# Patient Record
Sex: FEMALE | Race: BLACK OR AFRICAN AMERICAN | Employment: UNEMPLOYED | URBAN - METROPOLITAN AREA
[De-identification: names, ages, dates, MRNs, and addresses within clinical notes are randomized per-mention and may not be internally consistent; named-entity substitution may affect disease eponyms.]

---

## 2017-05-12 LAB
CHLAMYDIA, EXTERNAL: NEGATIVE
HBSAG, EXTERNAL: NEGATIVE
HIV, EXTERNAL: NON REACTIVE
N. GONORRHEA, EXTERNAL: NEGATIVE
RUBELLA, EXTERNAL: NORMAL
TYPE, ABO & RH, EXTERNAL: NORMAL

## 2017-06-09 ENCOUNTER — ANESTHESIA EVENT (OUTPATIENT)
Dept: SURGERY | Age: 37
End: 2017-06-09
Payer: COMMERCIAL

## 2017-06-09 NOTE — H&P
EDC:2017  EGA: 14 weeks, 0 days      Vital Signs   31Years Old Female  Weight: 221 pounds  BP:       120/74    Pap/HPV/Gardasil History   History of abnormal pap: yes  Gardasil Injection History: Not Applicable    Patient's Prenatal Care with Doctor of Record Rafael Solis MD Notable For -    High risk pregnancy history of PTB/incompetent cervix  Cervical incompetence, cerclage and anatoly at 14 weeks____   lab screening  Obesity in pregnancy BMI>34.99-FS ____  Previous miscarriages  Obesity (BMI > 29.99)              Past Pregnancy History      :  7     Term Births:  0     Premature Births: 1     Living Children: 1     Para:   1     Prev : 1     Aborta:  5     Elect. Ab:  0     Spont. Ab:  5     Ectopics:  0        Allergies    This patient has no known allergies. Medications Removed from Medication List        [address] for Follow-up Visit     Estimated weeks of        gestation:  14 0/7     Weight:  221     Blood pressure: 120 / 74     Urine Protein:  N     Urine Glucose: N     Headache:  +     Nausea/vomiting: No     Edema:  0     Vaginal bleeding: no     Vaginal discharge: no     Fetal activity:  N/A     FHR:   159     Next visit:  4 wk     Preceptor:  sadie     Comment:  Cx length was 42-43 mm. Cerclage scheduled for next week. WIll also begin [place] once cerclage placed. Impression & Recommendations:    Problem # 1:  Cervical incompetence, cerclage and anatoly at 15 weeks____ (ICD-V72.31) (KUE87-Z18.32)  To OR for cerclage. I reviewed with the patient indications, alternatives, risks, and benefits of proposed procedure, the risks of which include injury to bowel, bladder, nerves, blood vessels, fetus, risk of bleeding and infection, and inherent risks of anesthesia, including death. The patient indicates understanding of these risks, and agrees to the proposed procedure. She is instructed to stay NPO after midnight the night before surgery. Orders:  Antepartum Care (CPT-10)        Medications (at conclusion of this visit)    05/12/2017 PROMETRIUM 200 MG CAPS (PROGESTERONE MICRONIZED) one tab vaginally daily  05/12/2017 PRENATAL VITAMIN TABS (PRENATAL VIT-FE FUMARATE-FA TABS)           Primary Provider:  Emmanuel Hanna MD    CC:  incompetent cervix. History of Present Illness:  Presents for cerclage, hx of incompetent cervix and preganncy loss. Successful last pregnancy with cerclage placed.           Past Medical History:     Reviewed history from 07/01/2016 and no changes required:        none    Past Surgical History:     Reviewed history from 05/12/2017 and no changes required:        C section 2015        2 D&C 2014 and 2002        cerclage    Family History Summary:      Reviewed history Last on 05/12/2017 and no changes required:06/09/2017      General Comments - FH:  Family history transferred to [address] [place] [address]     Social History:     Reviewed history and no changes required:      Previous Tobacco Use: Signed On - 05/12/2017  Smoked Tobacco Use:  Never smoker  Smokeless Tobacco Use:  Never  Passive smoke exposure:  no  Drug use:  no  HIV high-risk behavior:  no  Caffeine use:  1 drinks per day    Previous Alcohol Use: Signed On - 05/12/2017  Alcohol use:  no  Exercise:  no  Seatbelt use:  100 %  Sun Exposure:  occasionally    PAP Smear History:     Date of Last PAP Smear:  03/15/2017      Review of Systems        See HPI      Vital Signs    Blood Pressure: 120 / 74    Weight:  221 pounds      Physical Exam     General           General appearance:  no acute distress    Head           Inspection:   normal    Eyes           External:   EOM intact    ENT           Dental:   adequate dentition    Chest           Lungs:  clear to auscultation          Heart:  regular rate and rhythm    Extremeties           Extremeties:  0 edema    Neurological           Reflexes:  2+ and symmetric with no pathological reflexes    Psych           Orientation: oriented to time, place, and person          Mood:  no appearance of anxiety, depression, or agitation    Lymph           Inguinal:  no inguinal adenopathy    Skin           Inspection:  no rashes, suspicious lesions, or ulcerations    Abdomen           Abdomen:  gravid    Pelvic Exam           EGBUS:  no lesions          Vagina:  normal appearing without lesions or discharge          Uterus:  gravid          Cervix:  no lesions or discharge    Allergies    This patient has no known allergies. Medications Removed from Medication List        Impression & Recommendations:    Problem # 1:  Cervical incompetence, cerclage and anatoly at 15 weeks____ (ICD-V72.31) (AQX21-H81.32)  To OR for cerclage. I reviewed with the patient indications, alternatives, risks, and benefits of proposed procedure, the risks of which include injury to bowel, bladder, nerves, blood vessels, fetus, risk of bleeding and infection, and inherent risks of anesthesia, including death. The patient indicates understanding of these risks, and agrees to the proposed procedure. She is instructed to stay NPO after midnight the night before surgery.    Orders:  Antepartum Care (CPT-10)        Medications (at conclusion of this visit)    05/12/2017 PROMETRIUM 200 MG CAPS (PROGESTERONE MICRONIZED) one tab vaginally daily  05/12/2017 PRENATAL VITAMIN TABS (PRENATAL VIT-FE FUMARATE-FA TABS)           LABORATORY DATA   TEST DATE RESULT   Group B Strep culture 07/02/2016 *                                   (Group B Strep Culture Result Field)   Blood Type 05/12/2017 O                                             (Blood Type Result Field)   Rh 05/12/2017 Positive                                   (Rh Result Field)   Rhogam Inj Given 07/02/2016 *   Tdap Vaccine Given 07/02/2016 *   Antibody Screen 05/12/2017 Negative   Rubella  [facility] Reference Ranges On or After 3/10/14                  <0.90              Non-immune      0.90 - 0.99     Equivocal      >0.99 Immune    [facility] Reference Ranges  Before 3/10/14           <5                 Non-immune             5 - 9               Equivocal            >9                 Immune  [facility] Reference Ranges       < Or = 0.90       Negative             0.91-1.09          Equivocal            > Or = 1.10       Positive   05/12/2017     2.74     TPA (T Pallidum Antibodies) 05/12/2017 Negative   Serology (RPR) 07/02/2016 *   HBsAg 05/12/2017 Negative   HIV 05/12/2017 Non Reactive   Hemoglobin 05/12/2017 13.7   Hematocrit 05/12/2017 40.7   Platelets 46/58/1666 180 X10E3/UL   TSH 07/02/2016 *   Urine Culture 05/12/2017 Negative   GC DNA Probe 05/12/2017 Negative   Chlamydia DNA 05/12/2017 Negative   PAP 03/15/2017 NIL   Flu Vaccine Given 07/02/2016 *   HGBA1C 07/02/2016 *   HGB Electro 05/12/2017 AA   T4, Free 07/02/2016 *   BG Fasting 07/02/2016 *   GTT 1H 50G 07/02/2016 *   GTT 1H 100G 07/02/2016 *   GTT 2H 100G 07/02/2016 *   GTT 3H 100G 07/02/2016 *   Glucose Plasma 07/02/2016 *   CF Accept or Decline 07/02/2016 *   CF Screen Result 05/12/2017 Negative   Nuchal Trans 07/02/2016 *   AFP Only     Tetra     AFP Serum 07/02/2016 *   CVS 07/02/2016 *   AFP Amniotic 07/02/2016 *   Amnio Karyo 07/02/2016 *   FISH 07/02/2016 *   GC Culture 07/02/2016 *   Chlamydia Cult 07/02/2016 *   Ureaplasma     Mycoplasma     WBC 05/12/2017 5.8 X10E3/UL   RBC 05/12/2017 4.52 X10E6/UL   MCV 05/12/2017 90   MCH 05/12/2017 30.3   MCHC RBC 05/12/2017 33.7     ULTRASOUND DATA   TEST DATE RESULT   Estimated Fetal Weight                                       Weight %                                                  PETE                      BPP     Cervical Length (mm) 09/26/2016 30.000     ]      Electronically signed by Kimberly Villagomez MD on 06/09/2017 at 10:42 AM    ________________________________________________________________________    The History and Physical is reviewed today.   The patient is seen and examined and no changes are required.   Emmanuel Hanna MD  6/12/2017  7:57 AM

## 2017-06-12 ENCOUNTER — ANESTHESIA (OUTPATIENT)
Dept: SURGERY | Age: 37
End: 2017-06-12
Payer: COMMERCIAL

## 2017-06-12 ENCOUNTER — HOSPITAL ENCOUNTER (OUTPATIENT)
Age: 37
Setting detail: OUTPATIENT SURGERY
Discharge: HOME | End: 2017-06-12
Attending: OBSTETRICS & GYNECOLOGY | Admitting: OBSTETRICS & GYNECOLOGY
Payer: COMMERCIAL

## 2017-06-12 VITALS
OXYGEN SATURATION: 100 % | RESPIRATION RATE: 18 BRPM | TEMPERATURE: 98.2 F | HEIGHT: 63 IN | SYSTOLIC BLOOD PRESSURE: 129 MMHG | DIASTOLIC BLOOD PRESSURE: 67 MMHG | HEART RATE: 85 BPM

## 2017-06-12 LAB
DAILY QC (YES/NO)?: YES
HGB BLD-MCNC: 13.4 G/DL (ref 11.5–16)

## 2017-06-12 PROCEDURE — 77030014125 HC TY EPDRL BBMI -B: Performed by: ANESTHESIOLOGY

## 2017-06-12 PROCEDURE — 76030000000 HC AMB SURG OR TIME 0.5 TO 1: Performed by: OBSTETRICS & GYNECOLOGY

## 2017-06-12 PROCEDURE — 74011000250 HC RX REV CODE- 250

## 2017-06-12 PROCEDURE — 76210000040 HC AMBSU PH I REC FIRST 0.5 HR: Performed by: OBSTETRICS & GYNECOLOGY

## 2017-06-12 PROCEDURE — 85018 HEMOGLOBIN: CPT | Performed by: OBSTETRICS & GYNECOLOGY

## 2017-06-12 PROCEDURE — 77030020255 HC SOL INJ LR 1000ML BG: Performed by: OBSTETRICS & GYNECOLOGY

## 2017-06-12 PROCEDURE — 77030018846 HC SOL IRR STRL H20 ICUM -A: Performed by: OBSTETRICS & GYNECOLOGY

## 2017-06-12 PROCEDURE — 76210000056 HC AMBSU PH II REC 1.5 TO 2 HR: Performed by: OBSTETRICS & GYNECOLOGY

## 2017-06-12 PROCEDURE — 74011250636 HC RX REV CODE- 250/636: Performed by: ANESTHESIOLOGY

## 2017-06-12 PROCEDURE — 74011250636 HC RX REV CODE- 250/636

## 2017-06-12 PROCEDURE — 77030021352 HC CBL LD SYS DISP COVD -B: Performed by: OBSTETRICS & GYNECOLOGY

## 2017-06-12 PROCEDURE — 77030005537 HC CATH URETH BARD -A: Performed by: OBSTETRICS & GYNECOLOGY

## 2017-06-12 PROCEDURE — 76060000061 HC AMB SURG ANES 0.5 TO 1 HR: Performed by: OBSTETRICS & GYNECOLOGY

## 2017-06-12 PROCEDURE — 77030002986 HC SUT PROL J&J -A: Performed by: OBSTETRICS & GYNECOLOGY

## 2017-06-12 PROCEDURE — 77030002937 HC SUT MERS J&J -B: Performed by: OBSTETRICS & GYNECOLOGY

## 2017-06-12 RX ORDER — SODIUM CHLORIDE, SODIUM LACTATE, POTASSIUM CHLORIDE, CALCIUM CHLORIDE 600; 310; 30; 20 MG/100ML; MG/100ML; MG/100ML; MG/100ML
25 INJECTION, SOLUTION INTRAVENOUS CONTINUOUS
Status: DISCONTINUED | OUTPATIENT
Start: 2017-06-12 | End: 2017-06-12 | Stop reason: HOSPADM

## 2017-06-12 RX ORDER — ACETAMINOPHEN AND CODEINE PHOSPHATE 300; 30 MG/1; MG/1
2 TABLET ORAL
Qty: 20 TAB | Refills: 0 | Status: ON HOLD | OUTPATIENT
Start: 2017-06-12 | End: 2017-12-12

## 2017-06-12 RX ORDER — OXYCODONE AND ACETAMINOPHEN 5; 325 MG/1; MG/1
1 TABLET ORAL ONCE
Status: DISCONTINUED | OUTPATIENT
Start: 2017-06-12 | End: 2017-06-12 | Stop reason: HOSPADM

## 2017-06-12 RX ORDER — SODIUM CHLORIDE 0.9 % (FLUSH) 0.9 %
5-10 SYRINGE (ML) INJECTION AS NEEDED
Status: DISCONTINUED | OUTPATIENT
Start: 2017-06-12 | End: 2017-06-12 | Stop reason: HOSPADM

## 2017-06-12 RX ORDER — ONDANSETRON 2 MG/ML
4 INJECTION INTRAMUSCULAR; INTRAVENOUS AS NEEDED
Status: DISCONTINUED | OUTPATIENT
Start: 2017-06-12 | End: 2017-06-12 | Stop reason: HOSPADM

## 2017-06-12 RX ORDER — LIDOCAINE HYDROCHLORIDE 10 MG/ML
0.1 INJECTION, SOLUTION EPIDURAL; INFILTRATION; INTRACAUDAL; PERINEURAL AS NEEDED
Status: DISCONTINUED | OUTPATIENT
Start: 2017-06-12 | End: 2017-06-12 | Stop reason: HOSPADM

## 2017-06-12 RX ORDER — DIPHENHYDRAMINE HYDROCHLORIDE 50 MG/ML
12.5 INJECTION, SOLUTION INTRAMUSCULAR; INTRAVENOUS AS NEEDED
Status: DISCONTINUED | OUTPATIENT
Start: 2017-06-12 | End: 2017-06-12 | Stop reason: HOSPADM

## 2017-06-12 RX ORDER — PHENYLEPHRINE HCL IN 0.9% NACL 0.4MG/10ML
SYRINGE (ML) INTRAVENOUS AS NEEDED
Status: DISCONTINUED | OUTPATIENT
Start: 2017-06-12 | End: 2017-06-12 | Stop reason: HOSPADM

## 2017-06-12 RX ORDER — MORPHINE SULFATE 10 MG/ML
2 INJECTION, SOLUTION INTRAMUSCULAR; INTRAVENOUS
Status: DISCONTINUED | OUTPATIENT
Start: 2017-06-12 | End: 2017-06-12 | Stop reason: HOSPADM

## 2017-06-12 RX ORDER — CHLOROPROCAINE HYDROCHLORIDE 30 MG/ML
INJECTION, SOLUTION EPIDURAL; INFILTRATION; INTRACAUDAL; PERINEURAL
Status: DISCONTINUED
Start: 2017-06-12 | End: 2017-06-12 | Stop reason: HOSPADM

## 2017-06-12 RX ORDER — FENTANYL CITRATE 50 UG/ML
25 INJECTION, SOLUTION INTRAMUSCULAR; INTRAVENOUS
Status: DISCONTINUED | OUTPATIENT
Start: 2017-06-12 | End: 2017-06-12 | Stop reason: HOSPADM

## 2017-06-12 RX ORDER — SODIUM CHLORIDE, SODIUM LACTATE, POTASSIUM CHLORIDE, CALCIUM CHLORIDE 600; 310; 30; 20 MG/100ML; MG/100ML; MG/100ML; MG/100ML
INJECTION, SOLUTION INTRAVENOUS
Status: DISCONTINUED | OUTPATIENT
Start: 2017-06-12 | End: 2017-06-12 | Stop reason: HOSPADM

## 2017-06-12 RX ORDER — FENTANYL CITRATE 50 UG/ML
INJECTION, SOLUTION INTRAMUSCULAR; INTRAVENOUS AS NEEDED
Status: DISCONTINUED | OUTPATIENT
Start: 2017-06-12 | End: 2017-06-12 | Stop reason: HOSPADM

## 2017-06-12 RX ORDER — HYDROMORPHONE HYDROCHLORIDE 1 MG/ML
.2-.5 INJECTION, SOLUTION INTRAMUSCULAR; INTRAVENOUS; SUBCUTANEOUS ONCE
Status: DISCONTINUED | OUTPATIENT
Start: 2017-06-12 | End: 2017-06-12 | Stop reason: HOSPADM

## 2017-06-12 RX ORDER — SODIUM CHLORIDE 0.9 % (FLUSH) 0.9 %
5-10 SYRINGE (ML) INJECTION EVERY 8 HOURS
Status: DISCONTINUED | OUTPATIENT
Start: 2017-06-12 | End: 2017-06-12 | Stop reason: HOSPADM

## 2017-06-12 RX ORDER — ONDANSETRON 2 MG/ML
INJECTION INTRAMUSCULAR; INTRAVENOUS AS NEEDED
Status: DISCONTINUED | OUTPATIENT
Start: 2017-06-12 | End: 2017-06-12 | Stop reason: HOSPADM

## 2017-06-12 RX ADMIN — SODIUM CHLORIDE, SODIUM LACTATE, POTASSIUM CHLORIDE, CALCIUM CHLORIDE: 600; 310; 30; 20 INJECTION, SOLUTION INTRAVENOUS at 09:02

## 2017-06-12 RX ADMIN — FENTANYL CITRATE 50 MCG: 50 INJECTION, SOLUTION INTRAMUSCULAR; INTRAVENOUS at 08:28

## 2017-06-12 RX ADMIN — SODIUM CHLORIDE, SODIUM LACTATE, POTASSIUM CHLORIDE, AND CALCIUM CHLORIDE 500 ML: 600; 310; 30; 20 INJECTION, SOLUTION INTRAVENOUS at 08:10

## 2017-06-12 RX ADMIN — Medication 40 MCG: at 08:44

## 2017-06-12 RX ADMIN — FENTANYL CITRATE 50 MCG: 50 INJECTION, SOLUTION INTRAMUSCULAR; INTRAVENOUS at 08:19

## 2017-06-12 RX ADMIN — ONDANSETRON 4 MG: 2 INJECTION INTRAMUSCULAR; INTRAVENOUS at 08:47

## 2017-06-12 RX ADMIN — SODIUM CHLORIDE, SODIUM LACTATE, POTASSIUM CHLORIDE, CALCIUM CHLORIDE: 600; 310; 30; 20 INJECTION, SOLUTION INTRAVENOUS at 08:05

## 2017-06-12 NOTE — ANESTHESIA POSTPROCEDURE EVALUATION
Post-Anesthesia Evaluation and Assessment    Patient: Niurka Gonsalez MRN: [id number]  SSN: [id number]    YOB: 1980  Age: 31 y.o. Sex: female       Cardiovascular Function/Vital Signs  Visit Vitals    /67    Pulse 85    Temp 36.8 °C (98.2 °F)    Resp 18    Ht 5' 3\" (1.6 m)    SpO2 100%       Patient is status post spinal, MAC anesthesia for Procedure(s):  CERVICAL CERCLAGE  (CHOICE). Nausea/Vomiting: None    Postoperative hydration reviewed and adequate. Pain:  Pain Scale 1: Numeric (0 - 10) (06/12/17 1100)  Pain Intensity 1: 0 (06/12/17 1100)   Managed    Neurological Status:   Neuro (WDL): Within Defined Limits (06/12/17 1100)  Neuro  LUE Motor Response: Spontaneous  (06/12/17 1030)  LLE Motor Response: Spontaneous  (06/12/17 1030)  RUE Motor Response: Spontaneous  (06/12/17 1030)  RLE Motor Response: Spontaneous  (06/12/17 1030)   At baseline    Mental Status and Level of Consciousness: Arousable    Pulmonary Status:   O2 Device: Room air (06/12/17 0912)   Adequate oxygenation and airway patent    Complications related to anesthesia: None    Post-anesthesia assessment completed.  No concerns    Signed By: Randal Cruz MD     June 12, 2017

## 2017-06-12 NOTE — ANESTHESIA PROCEDURE NOTES
Spinal Block    Start time: 6/12/2017 8:23 AM  End time: 6/12/2017 8:37 AM  Performed by: Reynaldo Guerra  Authorized by: Reynaldo Guerra     Pre-procedure: Indications: at surgeon's request and primary anesthetic  Preanesthetic Checklist: patient identified, risks and benefits discussed, anesthesia consent, site marked, patient being monitored and timeout performed    Timeout Time: 08:25          Spinal Block:   Patient Position:  Seated  Prep Region:  Lumbar  Prep: Betadine and patient draped      Location:  L3-4  Technique:  Single shot  Anesthesia block local: 3% Chloroprocaine used for spinal block.   2% lidocaine skin wheal.  Local Dose (mL):  1.5    Needle:   Needle Type:  Pencan  Needle Gauge:  25 G  Attempts:  2      Events: CSF confirmed, no blood with aspiration and no paresthesia        Assessment:  Insertion:  Uncomplicated  Patient tolerance:  Patient tolerated the procedure well with no immediate complications

## 2017-06-12 NOTE — OP NOTES
OB/GYN BRIEF OPERATIVE NOTE  Collin Sena  Date of Surgery: 6/12/2017  Preoperative Diagnosis: CERVICAL INCOMPETENCE, PREGNANT  Postoperative Diagnosis: CERVICAL INCOMPETENCE, PREGNANT    Procedure: Cervical Cerclage    Surgeon: Myah Tinajero MD  Assistant(s): Hospital Assistant  Anesthesia: Other   Estimated Blood Loss:  minimal  Specimens: * No specimens in log *   Findings: normal appearing cervix. Complications: None  Drains: None    Procedure Detail:      After proper patient identification and consent, the patient was taken to the operating room, where spinal anesthesia was administered and found to be adequate. Red rubber catheter is used to drain the bladder. The patient was prepped and draped in the normal sterile fashion. A sidearm speculum is inserted into the vagina and the cervix identified. A suture of #1 prolene is placed at 6:00 on the cervix 1 cm proximal to the tip and brought out at 3:00. It is replaced at 3:00 and brought out at 12:00, replaced at 12:00 and brought out at 9:00, replaced at 9:00 and brought out again at 6:00 and securely tied. A knot for future identification is placed 1 cm from the original knot and the suture is then held for cervical traction. Another #1 prolene is placed at 6:00 about 2 cm proximal to the first stitch and brought through and around the cervix and fastened identically. The cervix is hemostatic at the end of the case and tightly closed on exam.  The speculum is removed and the vagina swiped clear of blood and debris. Instrument, lap and needle counts are correct x 2. The pt is taken to PACU in stable condition.         Myah Tinajero MD

## 2017-06-12 NOTE — PERIOP NOTES
PACU~  0910-Pt received to PACU, drowsy, denies complaints. VSS. Had spinal, unable to move legs. NSR with occ PVC's, same as preop & intraop. SCD's connected. 0915-Dr Sargent at bedside. FHT's auscultated at 155 bpm. Dermatome levels assessed, T10 on right  0930-Dermatomes at T11. HOB up, pt drinking juice and eating crackers. Denies any complaints. 0945- here. Discharge instructions reviewed. Questions answered. He has small child with him so he will return to waiting room. 1015-Pt resting with eyes closed. VSS. 1040-Pt able to bend legs and lift buttocks off of stretcher slightly. No vaginal bleeding  1052-Pt awake, denies complaints. HOB up, pt drinking soda and eating peanut butter crackers. 1105-Pt states she is feeling the urge to void. She is moving/bending her legs and able to pick buttocks off of bed. Assisted pt with dressing. No vaginal bleeding. 1110-Pt able to stand and bear full weight. Pt ambulated to bathroom with 2 nurse assist. Gait steady. Pt voided.  called for ride. 1115- PIV removed. 1120-Pt discharged home in stable condition via w/c to car in stable condition. Pt nor  have any further questions or concerns regarding d/c instructions.

## 2017-06-12 NOTE — DISCHARGE INSTRUCTIONS
After Care Instructions For Cervical Conization    1. You may resume your usual diet once the nausea resolves. Initially, you may try sips of warm fluids and a bland diet. 2. Avoid heavy lifting and straining. Gradually increase your activity. First, try walking and doing light activity around the house. Resume your normal habits if no significant discomfort or bleeding develops. Most women can return to work within one to four days after this procedure. You may take showers. Avoid using a tub bath, swimming pool or hot tub for 2 weeks    It takes the cervix 5-6 weeks to heal.  During this time, there will be some minimal vaginal bleeding or yellow vaginal discharge. This may increase with activity. 4. Do not put anything in your vagina as this may cause excessive bleeding or infection. Do not douche, use tampons or have intercourse for at least 2 weeks. 5. It is normal to feel some mild crampy pain in your lower abdomen. This discomfort should be relieved with Tylenol 3 which is prescribed    6. Contact the office if you have excessive bleeding (saturating a pad an hour for two hours or passing large clots), chills, or a temperature greater than 100.4.     7. You should be seen in the office following the procedure. Your physician should have given you specific information regarding the appropriate time for this appointment. Please contact the office for an appointment if this has not already been arranged. Our office phone number is [phone]. If appropriate, the microscopic results from your procedure will be discussed at this follow-up visit. Frequently the results have returned prior to this visit and if this is the case, you will be contacted by phone.          DO NOT TAKE TYLENOL/ACETAMINOPHEN WITH Tylenol with Codeine    TAKE NARCOTIC PAIN MEDICATIONS WITH FOOD   Narcotics tend to be constipating, we suggest taking a stool softener such as Colace or Miralax (follow package instructions). DO NOT DRIVE WHILE TAKING NARCOTIC PAIN MEDICATIONS. DO NOT TAKE SLEEPING MEDICATIONS OR ANTIANXIETY MEDICATIONS WHILE TAKING NARCOTIC PAIN MEDICATIONS,  ESPECIALLY THE NIGHT OF ANESTHESIA. CPAP PATIENTS BE SURE TO WEAR MACHINE WHENEVER NAPPING OR SLEEPING. DISCHARGE SUMMARY from Nurse    The following personal items collected during your admission are returned to you:   Dental Appliance: Dental Appliances: None  Vision: Visual Aid: Glasses AT HOME  Hearing Aid:    Jewelry: Jewelry: None  Clothing: Clothing: With patient  Other Valuables: Other Valuables: None  Valuables sent to safe:        PATIENT INSTRUCTIONS:    After General Anesthesia or Intravenous Sedation, for 24 hours or while taking prescription Narcotics:        Someone should be with you for the next 24 hours. For your own safety, a responsible adult must drive you home. · Limit your activities  · Recommended activity: Rest today, up with assistance today. Do not climb stairs or shower unattended for the next 24 hours. · Please start with a soft bland diet and advance as tolerated (no nausea) to regular diet. · If you have a sore throat you should try the following: fluids, warm salt water gargles, or throat lozenges. If it does not improve after several days please follow up with your primary physician. · Do not drive and operate hazardous machinery  · Do not make important personal or business decisions  · Do  not drink alcoholic beverages  · If you have not urinated within 8 hours after discharge, please contact your surgeon on call.     Report the following to your surgeon:  · Excessive pain, swelling, redness or odor of or around the surgical area  · Temperature over 100.5  · Nausea and vomiting lasting longer than 4 hours or if unable to take medications  · Any signs of decreased circulation or nerve impairment to extremity: change in color, persistent  numbness, tingling, coldness or increase pain      · You will receive a Post Operative Call from one of the Recovery Room Nurses on the day after your surgery to check on you. It is very important for us to know how you are recovering after your surgery. If you have an issue or need to speak with someone, please call your surgeon, do not wait for the post operative call. · You may receive an e-mail or letter in the mail from Lucía regarding your experience with us in the Ambulatory Surgery Unit. Your feedback is valuable to us and we appreciate your participation in the survey. · If the above instructions are not adequate, please contact Jhoan Reese RN, [facility] anesthesia Nurse Manager or our Anesthesiologist, at [phone]. If you are having problems after your surgery, call the physician at his office number. · We wish you a speedy recovery ? What to do at Home:      *  Please give a list of your current medications to your Primary Care Provider. *  Please update this list whenever your medications are discontinued, doses are      changed, or new medications (including over-the-counter products) are added. *  Please carry medication information at all times in case of emergency situations. These are general instructions for a healthy lifestyle:    No smoking/ No tobacco products/ Avoid exposure to second hand smoke    Surgeon General's Warning:  Quitting smoking now greatly reduces serious risk to your health. Obesity, smoking, and sedentary lifestyle greatly increases your risk for illness    A healthy diet, regular physical exercise & weight monitoring are important for maintaining a healthy lifestyle    You may be retaining fluid if you have a history of heart failure or if you experience any of the following symptoms:  Weight gain of 3 pounds or more overnight or 5 pounds in a week, increased swelling in our hands or feet or shortness of breath while lying flat in bed.   Please call your doctor as soon as you notice any of these symptoms; do not wait until your next office visit. Recognize signs and symptoms of STROKE:    B - Balance  E - Eyes    F-  Face looks uneven    A-  Arms unable to move or move even    S-  Speech slurred or non-existent    T-  Time-call 911 as soon as signs and symptoms begin-DO NOT go       Back to bed or wait to see if you get better-TIME IS BRAIN. If you have not received your influenza and/or pneumococcal vaccine, please follow up with your primary care physician. The discharge information has been reviewed with the patient and spouse. The patient and spouse verbalized understanding.

## 2017-06-12 NOTE — PERIOP NOTES
Ender Ibrahim  1980  [id number]    Situation:  Verbal report given from: MELISSA Carroll CRNA  Procedure: Procedure(s):  CERVICAL CERCLAGE  (CHOICE)    Background:    Preoperative diagnosis: CERVICAL INCOMPITENCE, HIGH RISK PREGNANCY    Postoperative diagnosis: CERVICAL INCOMPITENCE, HIGH RISK PREGNANCY    :  Dr. Barbara Ramos    Assistant(s): Circ-1: Emma Anaya RN  Circ-Relief: Ricardo Markham RN  Scrub Tech-1: Catracho Nab  Surg Asst-1: Minnie Funez    Specimens: * No specimens in log *    Assessment:  Intra-procedure medications         Anesthesia gave intra-procedure sedation and medications, see anesthesia flow sheet     Intravenous fluids: LR@ KVO     Vital signs stable       Recommendation:    Permission to share finding with family or friend yes

## 2017-06-12 NOTE — ANESTHESIA PREPROCEDURE EVALUATION
Anesthetic History   No history of anesthetic complications            Review of Systems / Medical History  Patient summary reviewed, nursing notes reviewed and pertinent labs reviewed    Pulmonary  Within defined limits                 Neuro/Psych   Within defined limits           Cardiovascular  Within defined limits                     GI/Hepatic/Renal  Within defined limits              Endo/Other        Morbid obesity     Other Findings   Comments: IUP at 14 weeks  Cervical incompetence         Physical Exam    Airway  Mallampati: I  TM Distance: 4 - 6 cm  Neck ROM: normal range of motion   Mouth opening: Normal     Cardiovascular    Rhythm: regular  Rate: normal      Pertinent negatives: No murmur   Dental  No notable dental hx       Pulmonary  Breath sounds clear to auscultation               Abdominal  GI exam deferred       Other Findings            Anesthetic Plan    ASA: 2  Anesthesia type: spinal and MAC          Induction: Intravenous  Anesthetic plan and risks discussed with: Patient      FHTs 154

## 2017-06-12 NOTE — IP AVS SNAPSHOT
[address] [facility] 
[phone] Patient: Mildred Mendez MRN: [id number] NVX:8/59/0278 You are allergic to the following No active allergies Recent Documentation Height OB Status Smoking Status 1.6 m Pregnant Never Smoker Emergency Contacts Name Discharge Info Relation Home Work Mobile Jaden Hart DISCHARGE CAREGIVER [3] Spouse [3] [phone] About your hospitalization You were admitted on:  June 12, 2017 You last received care in the:  [facility] PACU You were discharged on:  June 12, 2017 Unit phone number:  [phone] Why you were hospitalized Your primary diagnosis was:  Not on File Providers Seen During Your Hospitalizations Provider Role Specialty Primary office phone Sarina Welsh MD Attending Provider Obstetrics & Gynecology [phone] Your Primary Care Physician (PCP) Primary Care Physician Office Phone Office Fax NONE ** None ** ** None ** Follow-up Information Follow up With Details Comments Contact Info Sarina Welsh MD Schedule an appointment as soon as possible for a visit in 1 week  [address] [address] [address] 
[phone] Current Discharge Medication List  
  
START taking these medications Dose & Instructions Dispensing Information Comments Morning Noon Evening Bedtime  
 acetaminophen-codeine 300-30 mg per tablet Commonly known as:  TYLENOL-CODEINE #3 Your last dose was: Your next dose is:    
   
   
 Dose:  2 Tab Take 2 Tabs by mouth every four (4) hours as needed for Pain. Max Daily Amount: 12 Tabs. Quantity:  20 Tab Refills:  0 CONTINUE these medications which have NOT CHANGED Dose & Instructions Dispensing Information Comments Morning Noon Evening Bedtime PRENATAL #[id number] ACID PO Your last dose was: Your next dose is:    
   
   
 Dose:  1 Tab Take 1 Tab by mouth daily. Refills:  0  
     
   
   
   
  
 progesterone 200 mg capsule Commonly known as:  PROMETRIUM Your last dose was: Your next dose is:    
   
   
 Dose:  200 mg Insert 200 mg into vagina daily. Refills:  0 Where to Get Your Medications Information on where to get these meds will be given to you by the nurse or doctor. ! Ask your nurse or doctor about these medications  
  acetaminophen-codeine 300-30 mg per tablet Discharge Instructions After Care Instructions For Cervical Conization 1. You may resume your usual diet once the nausea resolves. Initially, you may try sips of warm fluids and a bland diet. 2. Avoid heavy lifting and straining. Gradually increase your activity. First, try walking and doing light activity around the house. Resume your normal habits if no significant discomfort or bleeding develops. Most women can return to work within one to four days after this procedure. You may take showers. Avoid using a tub bath, swimming pool or hot tub for 2 weeks It takes the cervix 5-6 weeks to heal.  During this time, there will be some minimal vaginal bleeding or yellow vaginal discharge. This may increase with activity. 4. Do not put anything in your vagina as this may cause excessive bleeding or infection. Do not douche, use tampons or have intercourse for at least 2 weeks. 5. It is normal to feel some mild crampy pain in your lower abdomen. This discomfort should be relieved with Tylenol 3 which is prescribed 6. Contact the office if you have excessive bleeding (saturating a pad an hour for two hours or passing large clots), chills, or a temperature greater than 100.4. 7. You should be seen in the office following the procedure. Your physician should have given you specific information regarding the appropriate time for this appointment. Please contact the office for an appointment if this has not already been arranged. Our office phone number is [phone]. If appropriate, the microscopic results from your procedure will be discussed at this follow-up visit. Frequently the results have returned prior to this visit and if this is the case, you will be contacted by phone. DO NOT TAKE TYLENOL/ACETAMINOPHEN WITH Tylenol with Codeine TAKE NARCOTIC PAIN MEDICATIONS WITH FOOD Narcotics tend to be constipating, we suggest taking a stool softener such as Colace or Miralax (follow package instructions). DO NOT DRIVE WHILE TAKING NARCOTIC PAIN MEDICATIONS. DO NOT TAKE SLEEPING MEDICATIONS OR ANTIANXIETY MEDICATIONS WHILE TAKING NARCOTIC PAIN MEDICATIONS,  ESPECIALLY THE NIGHT OF ANESTHESIA. CPAP PATIENTS BE SURE TO WEAR MACHINE WHENEVER NAPPING OR SLEEPING. DISCHARGE SUMMARY from Nurse The following personal items collected during your admission are returned to you:  
Dental Appliance: Dental Appliances: None Vision: Visual Aid: Glasses AT HOME Hearing Aid:   
Jewelry: Jewelry: None Clothing: Clothing: With patient Other Valuables: Other Valuables: None Valuables sent to safe:   
 
 
PATIENT INSTRUCTIONS: 
 
 
B - Balance E - Eyes F-  Face looks uneven A-  Arms unable to move or move even S-  Speech slurred or non-existent T-  Time-call 911 as soon as signs and symptoms begin-DO NOT go Back to bed or wait to see if you get better-TIME IS BRAIN. If you have not received your influenza and/or pneumococcal vaccine, please follow up with your primary care physician. The discharge information has been reviewed with the patient and spouse. The patient and spouse verbalized understanding. Discharge Instructions Attachments/References MEFS - ACETAMINOPHEN/CODEINE (TYLENOL WITH CODEINE NO. 3, TYLENOL W/CODEINE #4, TYLENOL WITH CODEINE NO. 4, TYLENOL W/CODEINE #3) - (BY MOUTH) ([facility]. .. Discharge Orders None Introducing [facility] SERVICES! Koki Naid[employer] introduces [employer] patient portal. Now you can access parts of your medical record, email your doctor's office, and request medication refills online. 1. In your internet browser, go to [email or web address] 2. Click on the First Time User? Click Here link in the Sign In box. You will see the New Member Sign Up page. 3. Enter your [employer] Access Code exactly as it appears below. You will not need to use this code after youve completed the sign-up process. If you do not sign up before the expiration date, you must request a new code. · Neo[employer] Access Code: [email or web address] Expires: 9/6/2017  6:41 AM 
 
4. Enter the last four digits of your Social Security Number (xxxx) and Date of Birth (mm/dd/yyyy) as indicated and click Submit. You will be taken to the next sign-up page. 5. Create a NeoStem ID. This will be your NeoSt[employer] login ID and cannot be changed, so think of one that is secure and easy to remember. 6. Create a NeoStem password. You can change your password at any time. 7. Enter your Password Reset Question and Answer. This can be used at a later time if you forget your password. 8. Enter your e-mail address. You will receive e-mail notification when new information is available in [address] 9. Click Sign Up. You can now view and download portions of your medical record. 10. Click the Download Summary menu link to download a portable copy of your medical information. If you have questions, please visit the Frequently Asked Questions section of the [employer] website. Remember, Neo[employer] is NOT to be used for urgent needs. For medical emergencies, dial 911. Now available from your iPhone and Android! General Information Please provide this summary of care documentation to your next provider. Patient Signature:  ____________________________________________________________ Date:  ____________________________________________________________  
  
Juana Schwabd Provider Signature:  ____________________________________________________________ Date:  ____________________________________________________________ More Information Acetaminophen/Codeine (Tylenol with Codeine No. 3, Tylenol w/Codeine #4, Tylenol With Codeine No. 4, Tylenol w/Codeine #3) - (By mouth) Why this medicine is used:  
Treats mild to moderately severe pain. This medicine contains a narcotic pain reliever. Contact a nurse or doctor right away if you have: 
· Extreme weakness, shallow breathing, slow heartbeat · Extreme drowsiness or confusion · Sweating or cold, clammy skin · Dark urine or pale stools, nausea, vomiting, loss of appetite, stomach pain · Yellow skin or eyes Common side effects: 
· Constipation · Nausea, vomiting · Tiredness © 2017 [facility] Information is for End User's use only and may not be sold, redistributed or otherwise used for commercial purposes.

## 2017-09-19 LAB
ANTIBODY SCREEN, EXTERNAL: NEGATIVE
T. PALLIDUM, EXTERNAL: NEGATIVE

## 2017-11-02 LAB — GRBS, EXTERNAL: POSITIVE

## 2017-12-12 ENCOUNTER — ANESTHESIA (OUTPATIENT)
Dept: LABOR AND DELIVERY | Age: 37
DRG: 540 | End: 2017-12-12
Payer: MEDICAID

## 2017-12-12 ENCOUNTER — HOSPITAL ENCOUNTER (INPATIENT)
Age: 37
LOS: 4 days | Discharge: HOME | DRG: 540 | End: 2017-12-16
Attending: OBSTETRICS & GYNECOLOGY | Admitting: OBSTETRICS & GYNECOLOGY
Payer: MEDICAID

## 2017-12-12 ENCOUNTER — ANESTHESIA EVENT (OUTPATIENT)
Dept: LABOR AND DELIVERY | Age: 37
DRG: 540 | End: 2017-12-12
Payer: MEDICAID

## 2017-12-12 PROBLEM — D69.6 THROMBOCYTOPENIA AFFECTING PREGNANCY (HCC): Status: ACTIVE | Noted: 2017-12-12

## 2017-12-12 PROBLEM — O99.119 THROMBOCYTOPENIA AFFECTING PREGNANCY (HCC): Status: ACTIVE | Noted: 2017-12-12

## 2017-12-12 PROBLEM — Z34.90 PREGNANT: Status: ACTIVE | Noted: 2017-12-12

## 2017-12-12 PROBLEM — O41.00X0 OLIGOHYDRAMNIOS: Status: ACTIVE | Noted: 2017-12-12

## 2017-12-12 PROBLEM — O13.9 GESTATIONAL HYPERTENSION: Status: ACTIVE | Noted: 2017-12-12

## 2017-12-12 LAB
ABO + RH BLD: NORMAL
ALBUMIN SERPL-MCNC: 2.7 G/DL (ref 3.5–5)
ALBUMIN/GLOB SERPL: 0.8 {RATIO} (ref 1.1–2.2)
ALP SERPL-CCNC: 133 U/L (ref 45–117)
ALT SERPL-CCNC: 15 U/L (ref 12–78)
ANION GAP SERPL CALC-SCNC: 10 MMOL/L (ref 5–15)
AST SERPL-CCNC: 15 U/L (ref 15–37)
BILIRUB SERPL-MCNC: 0.7 MG/DL (ref 0.2–1)
BLOOD GROUP ANTIBODIES SERPL: NORMAL
BUN SERPL-MCNC: 6 MG/DL (ref 6–20)
BUN/CREAT SERPL: 14 (ref 12–20)
CALCIUM SERPL-MCNC: 8.4 MG/DL (ref 8.5–10.1)
CHLORIDE SERPL-SCNC: 108 MMOL/L (ref 97–108)
CO2 SERPL-SCNC: 22 MMOL/L (ref 21–32)
CREAT SERPL-MCNC: 0.42 MG/DL (ref 0.55–1.02)
CREAT UR-MCNC: 25.8 MG/DL
ERYTHROCYTE [DISTWIDTH] IN BLOOD BY AUTOMATED COUNT: 13.9 % (ref 11.5–14.5)
GLOBULIN SER CALC-MCNC: 3.6 G/DL (ref 2–4)
GLUCOSE SERPL-MCNC: 74 MG/DL (ref 65–100)
HCT VFR BLD AUTO: 35.2 % (ref 35–47)
HGB BLD-MCNC: 12.5 G/DL (ref 11.5–16)
LDH SERPL L TO P-CCNC: 135 U/L (ref 81–246)
MCH RBC QN AUTO: 32 PG (ref 26–34)
MCHC RBC AUTO-ENTMCNC: 35.5 G/DL (ref 30–36.5)
MCV RBC AUTO: 90 FL (ref 80–99)
PLATELET # BLD AUTO: 118 K/UL (ref 150–400)
POTASSIUM SERPL-SCNC: 3.2 MMOL/L (ref 3.5–5.1)
PROT SERPL-MCNC: 6.3 G/DL (ref 6.4–8.2)
PROT UR-MCNC: 6 MG/DL (ref 0–11.9)
PROT/CREAT UR-RTO: 0.2
RBC # BLD AUTO: 3.91 M/UL (ref 3.8–5.2)
SODIUM SERPL-SCNC: 140 MMOL/L (ref 136–145)
SPECIMEN EXP DATE BLD: NORMAL
URATE SERPL-MCNC: 4.3 MG/DL (ref 2.6–6)
WBC # BLD AUTO: 7.1 K/UL (ref 3.6–11)

## 2017-12-12 PROCEDURE — 77030008459 HC STPLR SKN COOP -B

## 2017-12-12 PROCEDURE — 77030002974 HC SUT PLN J&J -A

## 2017-12-12 PROCEDURE — 77030018809 HC RETRCTR ALXSO DISP AMR -B

## 2017-12-12 PROCEDURE — 86900 BLOOD TYPING SEROLOGIC ABO: CPT | Performed by: OBSTETRICS & GYNECOLOGY

## 2017-12-12 PROCEDURE — 59025 FETAL NON-STRESS TEST: CPT

## 2017-12-12 PROCEDURE — 77030018836 HC SOL IRR NACL ICUM -A

## 2017-12-12 PROCEDURE — 77010026065 HC OXYGEN MINIMUM MEDICAL AIR

## 2017-12-12 PROCEDURE — 76010000392 HC C SECN EA ADDL 0.5 HR: Performed by: OBSTETRICS & GYNECOLOGY

## 2017-12-12 PROCEDURE — 99285 EMERGENCY DEPT VISIT HI MDM: CPT

## 2017-12-12 PROCEDURE — 84550 ASSAY OF BLOOD/URIC ACID: CPT | Performed by: OBSTETRICS & GYNECOLOGY

## 2017-12-12 PROCEDURE — 74011250636 HC RX REV CODE- 250/636: Performed by: OBSTETRICS & GYNECOLOGY

## 2017-12-12 PROCEDURE — 74011250636 HC RX REV CODE- 250/636

## 2017-12-12 PROCEDURE — 76815 OB US LIMITED FETUS(S): CPT

## 2017-12-12 PROCEDURE — 84156 ASSAY OF PROTEIN URINE: CPT | Performed by: OBSTETRICS & GYNECOLOGY

## 2017-12-12 PROCEDURE — 76060000078 HC EPIDURAL ANESTHESIA: Performed by: OBSTETRICS & GYNECOLOGY

## 2017-12-12 PROCEDURE — 74011250636 HC RX REV CODE- 250/636: Performed by: ANESTHESIOLOGY

## 2017-12-12 PROCEDURE — 36415 COLL VENOUS BLD VENIPUNCTURE: CPT | Performed by: OBSTETRICS & GYNECOLOGY

## 2017-12-12 PROCEDURE — 10907ZC DRAINAGE OF AMNIOTIC FLUID, THERAPEUTIC FROM PRODUCTS OF CONCEPTION, VIA NATURAL OR ARTIFICIAL OPENING: ICD-10-PCS | Performed by: OBSTETRICS & GYNECOLOGY

## 2017-12-12 PROCEDURE — 77030031139 HC SUT VCRL2 J&J -A

## 2017-12-12 PROCEDURE — 77030035236 HC SUT PDS STRATFX BARB J&J -B

## 2017-12-12 PROCEDURE — 77030011640 HC PAD GRND REM COVD -A

## 2017-12-12 PROCEDURE — 83615 LACTATE (LD) (LDH) ENZYME: CPT | Performed by: OBSTETRICS & GYNECOLOGY

## 2017-12-12 PROCEDURE — 75410000002 HC LABOR FEE PER 1 HR

## 2017-12-12 PROCEDURE — 75410000003 HC RECOV DEL/VAG/CSECN EA 0.5 HR

## 2017-12-12 PROCEDURE — 76010000391 HC C SECN FIRST 1 HR: Performed by: OBSTETRICS & GYNECOLOGY

## 2017-12-12 PROCEDURE — 85027 COMPLETE CBC AUTOMATED: CPT | Performed by: OBSTETRICS & GYNECOLOGY

## 2017-12-12 PROCEDURE — 74011000250 HC RX REV CODE- 250

## 2017-12-12 PROCEDURE — 77030032490 HC SLV COMPR SCD KNE COVD -B

## 2017-12-12 PROCEDURE — 80053 COMPREHEN METABOLIC PANEL: CPT | Performed by: OBSTETRICS & GYNECOLOGY

## 2017-12-12 PROCEDURE — 65410000002 HC RM PRIVATE OB

## 2017-12-12 PROCEDURE — 77030007866 HC KT SPN ANES BBMI -B: Performed by: ANESTHESIOLOGY

## 2017-12-12 PROCEDURE — 76060000033 HC ANESTHESIA 1 TO 1.5 HR: Performed by: OBSTETRICS & GYNECOLOGY

## 2017-12-12 RX ORDER — SODIUM CHLORIDE 0.9 % (FLUSH) 0.9 %
5-10 SYRINGE (ML) INJECTION AS NEEDED
Status: DISCONTINUED | OUTPATIENT
Start: 2017-12-12 | End: 2017-12-12 | Stop reason: HOSPADM

## 2017-12-12 RX ORDER — ACETAMINOPHEN 10 MG/ML
INJECTION, SOLUTION INTRAVENOUS AS NEEDED
Status: DISCONTINUED | OUTPATIENT
Start: 2017-12-12 | End: 2017-12-12 | Stop reason: HOSPADM

## 2017-12-12 RX ORDER — NALOXONE HYDROCHLORIDE 0.4 MG/ML
0.4 INJECTION, SOLUTION INTRAMUSCULAR; INTRAVENOUS; SUBCUTANEOUS AS NEEDED
Status: DISCONTINUED | OUTPATIENT
Start: 2017-12-12 | End: 2017-12-16 | Stop reason: HOSPADM

## 2017-12-12 RX ORDER — SODIUM CHLORIDE 0.9 % (FLUSH) 0.9 %
5-10 SYRINGE (ML) INJECTION EVERY 8 HOURS
Status: DISCONTINUED | OUTPATIENT
Start: 2017-12-12 | End: 2017-12-16 | Stop reason: HOSPADM

## 2017-12-12 RX ORDER — SODIUM CHLORIDE 0.9 % (FLUSH) 0.9 %
5-10 SYRINGE (ML) INJECTION AS NEEDED
Status: DISCONTINUED | OUTPATIENT
Start: 2017-12-12 | End: 2017-12-16 | Stop reason: HOSPADM

## 2017-12-12 RX ORDER — MORPHINE SULFATE 0.5 MG/ML
INJECTION, SOLUTION EPIDURAL; INTRATHECAL; INTRAVENOUS AS NEEDED
Status: DISCONTINUED | OUTPATIENT
Start: 2017-12-12 | End: 2017-12-12 | Stop reason: HOSPADM

## 2017-12-12 RX ORDER — SIMETHICONE 80 MG
80 TABLET,CHEWABLE ORAL AS NEEDED
Status: DISCONTINUED | OUTPATIENT
Start: 2017-12-12 | End: 2017-12-16 | Stop reason: HOSPADM

## 2017-12-12 RX ORDER — PHENYLEPHRINE HCL IN 0.9% NACL 0.4MG/10ML
SYRINGE (ML) INTRAVENOUS AS NEEDED
Status: DISCONTINUED | OUTPATIENT
Start: 2017-12-12 | End: 2017-12-12 | Stop reason: HOSPADM

## 2017-12-12 RX ORDER — ACETAMINOPHEN 500 MG
1000 TABLET ORAL
COMMUNITY
End: 2017-12-16

## 2017-12-12 RX ORDER — BUPIVACAINE HYDROCHLORIDE 7.5 MG/ML
INJECTION, SOLUTION EPIDURAL; RETROBULBAR AS NEEDED
Status: DISCONTINUED | OUTPATIENT
Start: 2017-12-12 | End: 2017-12-12 | Stop reason: HOSPADM

## 2017-12-12 RX ORDER — IBUPROFEN 600 MG/1
600 TABLET ORAL
Status: DISCONTINUED | OUTPATIENT
Start: 2017-12-12 | End: 2017-12-13

## 2017-12-12 RX ORDER — SODIUM CHLORIDE, SODIUM LACTATE, POTASSIUM CHLORIDE, CALCIUM CHLORIDE 600; 310; 30; 20 MG/100ML; MG/100ML; MG/100ML; MG/100ML
125 INJECTION, SOLUTION INTRAVENOUS CONTINUOUS
Status: DISCONTINUED | OUTPATIENT
Start: 2017-12-12 | End: 2017-12-16 | Stop reason: HOSPADM

## 2017-12-12 RX ORDER — SODIUM CHLORIDE, SODIUM LACTATE, POTASSIUM CHLORIDE, CALCIUM CHLORIDE 600; 310; 30; 20 MG/100ML; MG/100ML; MG/100ML; MG/100ML
1000 INJECTION, SOLUTION INTRAVENOUS CONTINUOUS
Status: DISCONTINUED | OUTPATIENT
Start: 2017-12-12 | End: 2017-12-12 | Stop reason: HOSPADM

## 2017-12-12 RX ORDER — ONDANSETRON 2 MG/ML
INJECTION INTRAMUSCULAR; INTRAVENOUS AS NEEDED
Status: DISCONTINUED | OUTPATIENT
Start: 2017-12-12 | End: 2017-12-12 | Stop reason: HOSPADM

## 2017-12-12 RX ORDER — KETOROLAC TROMETHAMINE 30 MG/ML
30 INJECTION, SOLUTION INTRAMUSCULAR; INTRAVENOUS
Status: ACTIVE | OUTPATIENT
Start: 2017-12-13 | End: 2017-12-14

## 2017-12-12 RX ORDER — CEFAZOLIN SODIUM IN 0.9 % NACL 2 G/100 ML
2 PLASTIC BAG, INJECTION (ML) INTRAVENOUS ONCE
Status: COMPLETED | OUTPATIENT
Start: 2017-12-12 | End: 2017-12-12

## 2017-12-12 RX ORDER — ZOLPIDEM TARTRATE 5 MG/1
5 TABLET ORAL
Status: DISCONTINUED | OUTPATIENT
Start: 2017-12-12 | End: 2017-12-16 | Stop reason: HOSPADM

## 2017-12-12 RX ORDER — OXYTOCIN/RINGER'S LACTATE 20/1000 ML
125-500 PLASTIC BAG, INJECTION (ML) INTRAVENOUS ONCE
Status: ACTIVE | OUTPATIENT
Start: 2017-12-12 | End: 2017-12-13

## 2017-12-12 RX ORDER — DIPHENHYDRAMINE HYDROCHLORIDE 50 MG/ML
25-50 INJECTION, SOLUTION INTRAMUSCULAR; INTRAVENOUS
Status: DISCONTINUED | OUTPATIENT
Start: 2017-12-12 | End: 2017-12-16 | Stop reason: HOSPADM

## 2017-12-12 RX ORDER — ONDANSETRON 2 MG/ML
4 INJECTION INTRAMUSCULAR; INTRAVENOUS
Status: DISCONTINUED | OUTPATIENT
Start: 2017-12-12 | End: 2017-12-16 | Stop reason: HOSPADM

## 2017-12-12 RX ORDER — OXYTOCIN 10 [USP'U]/ML
INJECTION, SOLUTION INTRAMUSCULAR; INTRAVENOUS AS NEEDED
Status: DISCONTINUED | OUTPATIENT
Start: 2017-12-12 | End: 2017-12-12 | Stop reason: HOSPADM

## 2017-12-12 RX ORDER — SODIUM CHLORIDE 0.9 % (FLUSH) 0.9 %
5-10 SYRINGE (ML) INJECTION EVERY 8 HOURS
Status: DISCONTINUED | OUTPATIENT
Start: 2017-12-12 | End: 2017-12-12 | Stop reason: HOSPADM

## 2017-12-12 RX ORDER — SODIUM CHLORIDE 0.9 % (FLUSH) 0.9 %
SYRINGE (ML) INJECTION
Status: DISPENSED
Start: 2017-12-12 | End: 2017-12-12

## 2017-12-12 RX ADMIN — SODIUM CHLORIDE, SODIUM LACTATE, POTASSIUM CHLORIDE, AND CALCIUM CHLORIDE 125 ML/HR: 600; 310; 30; 20 INJECTION, SOLUTION INTRAVENOUS at 21:48

## 2017-12-12 RX ADMIN — OXYTOCIN 20 UNITS: 10 INJECTION, SOLUTION INTRAMUSCULAR; INTRAVENOUS at 12:49

## 2017-12-12 RX ADMIN — SODIUM CHLORIDE, SODIUM LACTATE, POTASSIUM CHLORIDE, AND CALCIUM CHLORIDE 1000 ML: 600; 310; 30; 20 INJECTION, SOLUTION INTRAVENOUS at 10:50

## 2017-12-12 RX ADMIN — SODIUM CHLORIDE, SODIUM LACTATE, POTASSIUM CHLORIDE, AND CALCIUM CHLORIDE: 600; 310; 30; 20 INJECTION, SOLUTION INTRAVENOUS at 13:37

## 2017-12-12 RX ADMIN — ONDANSETRON HYDROCHLORIDE 4 MG: 2 INJECTION, SOLUTION INTRAVENOUS at 18:00

## 2017-12-12 RX ADMIN — SODIUM CHLORIDE, SODIUM LACTATE, POTASSIUM CHLORIDE, AND CALCIUM CHLORIDE 1000 ML: 600; 310; 30; 20 INJECTION, SOLUTION INTRAVENOUS at 11:49

## 2017-12-12 RX ADMIN — ACETAMINOPHEN 1000 MG: 10 INJECTION, SOLUTION INTRAVENOUS at 12:30

## 2017-12-12 RX ADMIN — Medication 80 MCG: at 12:26

## 2017-12-12 RX ADMIN — ONDANSETRON 4 MG: 2 INJECTION INTRAMUSCULAR; INTRAVENOUS at 12:29

## 2017-12-12 RX ADMIN — Medication 80 MCG: at 12:33

## 2017-12-12 RX ADMIN — CEFAZOLIN 2 G: 10 INJECTION, POWDER, FOR SOLUTION INTRAVENOUS; PARENTERAL at 12:15

## 2017-12-12 RX ADMIN — SODIUM CHLORIDE, SODIUM LACTATE, POTASSIUM CHLORIDE, AND CALCIUM CHLORIDE 1000 ML: 600; 310; 30; 20 INJECTION, SOLUTION INTRAVENOUS at 11:50

## 2017-12-12 RX ADMIN — SODIUM CHLORIDE, SODIUM LACTATE, POTASSIUM CHLORIDE, AND CALCIUM CHLORIDE: 600; 310; 30; 20 INJECTION, SOLUTION INTRAVENOUS at 12:48

## 2017-12-12 RX ADMIN — BUPIVACAINE HYDROCHLORIDE 12 MG: 7.5 INJECTION, SOLUTION EPIDURAL; RETROBULBAR at 12:23

## 2017-12-12 RX ADMIN — SODIUM CHLORIDE, SODIUM LACTATE, POTASSIUM CHLORIDE, AND CALCIUM CHLORIDE: 600; 310; 30; 20 INJECTION, SOLUTION INTRAVENOUS at 12:15

## 2017-12-12 RX ADMIN — MORPHINE SULFATE 0.5 MCG: 0.5 INJECTION, SOLUTION EPIDURAL; INTRATHECAL; INTRAVENOUS at 12:23

## 2017-12-12 RX ADMIN — Medication 80 MCG: at 12:38

## 2017-12-12 NOTE — ANESTHESIA PROCEDURE NOTES
Spinal Block    Start time: 12/12/2017 12:19 PM  End time: 12/12/2017 12:23 PM  Performed by: Fauzia Paul  Authorized by: Fauzia Paul     Pre-procedure: Indications: primary anesthetic  Preanesthetic Checklist: risks and benefits discussed, site marked and timeout performed    Timeout Time: 12:19          Spinal Block:   Patient Position:  Seated  Prep Region:  Lumbar  Prep: DuraPrep      Location:  L3-4  Technique:  Single shot  Local:  Lidocaine 2%  Local Dose (mL):  3    Needle:   Needle Type:  Pencil-tip  Needle Gauge:  25 G  Attempts:  1      Events: CSF confirmed and no blood with aspiration        Assessment:  Insertion:  Uncomplicated  Patient tolerance:  Patient tolerated the procedure well with no immediate complications  9.7TK 6.54% Spinal Marcaine with dextrose + 0.5 mg Duramorph was deposited into the CSF.

## 2017-12-12 NOTE — LACTATION NOTE
Late Entry  Discussed with mother her plan for feeding. Reviewed the benefits of exclusive breast milk feeding during the hospital stay. Informed mother of the risks of using formula to supplement in the first few days of life as well as the benefits of successful breast milk feeding; referred mother to the handout in her admission packet related to these topics. Mother acknowledges understanding of information reviewed and states that it is her plan to breast milk feed exclusively her infant. Will support her choice and offer additional information as needed.

## 2017-12-12 NOTE — ANESTHESIA POSTPROCEDURE EVALUATION
Post-Anesthesia Evaluation and Assessment    Patient: Cierra Carter MRN: [id number]  SSN: [id number]    YOB: 1980  Age: 30 y.o. Sex: female       Cardiovascular Function/Vital Signs  Visit Vitals    /59    Pulse 72    Temp 36.6 °C (97.8 °F)    Resp 16    Ht 5' 3\" (1.6 m)    Wt 98.4 kg (217 lb)    SpO2 99%    BMI 38.44 kg/m2       Patient is status post spinal anesthesia for Procedure(s):   SECTION. Nausea/Vomiting: None    Postoperative hydration reviewed and adequate. Pain:  Pain Scale 1: Numeric (0 - 10) (17 1118)  Pain Intensity 1: 1 (17 1118)   Managed    Neurological Status:   Neuro (WDL): Within Defined Limits (17 0815)   At baseline    Mental Status and Level of Consciousness: Arousable    Pulmonary Status:   O2 Device: Room air (17 1336)   Adequate oxygenation and airway patent    Complications related to anesthesia: None    Post-anesthesia assessment completed.  No concerns    Signed By: Sonya Mullen MD     2017

## 2017-12-12 NOTE — IP AVS SNAPSHOT
Summary of Care Report The Summary of Care report has been created to help improve care coordination. Users with access to [employer] or [employer] [address] (Web-based application) may access additional patient information including the Discharge Summary. If you are not currently a [employer] [facility] user and need more information, please call the number listed below in the [place] section and ask to be connected with Medical Records. Facility Information Name Address Phone [address] [address] [phone] Patient Information Patient Name Sex  Claudene Hatch ([id number]) Female 1980 Discharge Information Admitting Provider Service Area Unit Dexter Martins MD / [address] [facility] Mother Infant / [phone] Discharge Provider Discharge Date/Time Discharge Disposition Destination Dexter Martins MD / [phone] 2017 Morning (Pending) AHR (none) Patient Language Language ENGLISH [13] Hospital Problems as of 2017  Reviewed: 2017 10:25 AM by Ana Laura Crain MD  
  
  
  
 Class Noted - Resolved Last Modified POA Active Problems Pregnant  2017 - Present 2017 by Dexter Martins MD Yes Entered by Dexter Martins MD  
  Oligohydramnios  2017 - Present 2017 by Dexter Martins MD Yes Entered by Dexter Martins MD  
  Thrombocytopenia affecting pregnancy ([facility] [address]  2017 - Present 2017 by Dexter Martins MD Yes Entered by Dexter Martins MD  
  Gestational hypertension  2017 - Present 2017 by Dexter Martins MD Unknown Entered by Dexter Martins MD  
  
Non-Hospital Problems as of 2017  Reviewed: 2017 10:25 AM by Ana Laura Crain MD  
 None You are allergic to the following No active allergies Current Discharge Medication List  
  
START taking these medications Dose & Instructions Dispensing Information Comments  
 ibuprofen 800 mg tablet Commonly known as:  MOTRIN Dose:  800 mg Take 1 Tab by mouth every eight (8) hours as needed for Pain. Quantity:  80 Tab Refills:  2 NIFEdipine ER 30 mg ER tablet Commonly known as:  PROCARDIA XL Dose:  30 mg Take 1 Tab by mouth daily. Quantity:  30 Tab Refills:  0  
   
 oxyCODONE-acetaminophen 5-325 mg per tablet Commonly known as:  PERCOCET Dose:  2 Tab Take 2 Tabs by mouth every six (6) hours as needed. Max Daily Amount: 8 Tabs. Quantity:  50 Tab Refills:  0 CONTINUE these medications which have NOT CHANGED Dose & Instructions Dispensing Information Comments PRENATAL #[id number] ACID PO Dose:  1 Tab Take 1 Tab by mouth daily. Refills:  0 STOP taking these medications Comments  
 progesterone 200 mg capsule Commonly known as:  PROMETRIUM TYLENOL EXTRA STRENGTH 500 mg tablet Generic drug:  acetaminophen Current Immunizations Name Date Influenza Vaccine 10/1/2017 Surgery Information ID Date/Time Status Primary Surgeon All Procedures Location [id number] 2017 [address] [facility] L&D OR    
  SECTION:  IUP 40  PREVIOUS C/SECTION IN LABOR Follow-up Information Follow up With Details Comments Contact Info Provider Unknown   Patient not available to ask Discharge Instructions  Section: What to Expect at [facility] Your Recovery A  section, or , is surgery to deliver your baby through a cut, called an incision, that the doctor makes in your lower belly and uterus.  
You may have some pain in your lower belly and need pain medicine for 1 to 2 weeks. You can expect some vaginal bleeding for several weeks. You will probably need about 6 weeks to fully recover. It is important to take it easy while the incision is healing. Avoid heavy lifting, strenuous activities, or exercises that strain the belly muscles while you are recovering. Ask a family member or friend for help with housework, cooking, and shopping. This care sheet gives you a general idea about how long it will take for you to recover. But each person recovers at a different pace. Follow the steps below to get better as quickly as possible. How can you care for yourself at home? Activity ? · Rest when you feel tired. Getting enough sleep will help you recover. ? · Try to walk each day. Start by walking a little more than you did the day before. Bit by bit, increase the amount you walk. Walking boosts blood flow and helps prevent pneumonia, constipation, and blood clots. ? · Avoid strenuous activities, such as bicycle riding, jogging, weightlifting, and aerobic exercise, for 6 weeks or until your doctor says it is okay. ? · Until your doctor says it is okay, do not lift anything heavier than your baby. ? · Do not do sit-ups or other exercises that strain the belly muscles for 6 weeks or until your doctor says it is okay. ? · Hold a pillow over your incision when you cough or take deep breaths. This will support your belly and decrease your pain. ? · You may shower as usual. Pat the incision dry when you are done. ? · You will have some vaginal bleeding. Wear sanitary pads. Do not douche or use tampons until your doctor says it is okay. ? · Ask your doctor when you can drive again. ? · You will probably need to take at least 6 weeks off work. It depends on the type of work you do and how you feel. ? · Ask your doctor when it is okay for you to have sex. Diet ? · You can eat your normal diet.  If your stomach is upset, try bland, low-fat foods like plain rice, broiled chicken, toast, and yogurt. ? · Drink plenty of fluids (unless your doctor tells you not to). ? · You may notice that your bowel movements are not regular right after your surgery. This is common. Try to avoid constipation and straining with bowel movements. You may want to take a fiber supplement every day. If you have not had a bowel movement after a couple of days, ask your doctor about taking a mild laxative. ? · If you are breastfeeding, do not drink any alcohol. Medicines ? · Your doctor will tell you if and when you can restart your medicines. He or she will also give you instructions about taking any new medicines. ? · If you take blood thinners, such as warfarin (Coumadin), clopidogrel (Plavix), or aspirin, be sure to talk to your doctor. He or she will tell you if and when to start taking those medicines again. Make sure that you understand exactly what your doctor wants you to do. ? · Take pain medicines exactly as directed. ¨ If the doctor gave you a prescription medicine for pain, take it as prescribed. ¨ If you are not taking a prescription pain medicine, ask your doctor if you can take an over-the-counter medicine. ? · If you think your pain medicine is making you sick to your stomach: 
¨ Take your medicine after meals (unless your doctor has told you not to). ¨ Ask your doctor for a different pain medicine. ? · If your doctor prescribed antibiotics, take them as directed. Do not stop taking them just because you feel better. You need to take the full course of antibiotics. Incision care ? · If you have strips of tape on the incision, leave the tape on for a week or until it falls off.  
? · Wash the area daily with warm, soapy water, and pat it dry. Don't use hydrogen peroxide or alcohol, which can slow healing. You may cover the area with a gauze bandage if it weeps or rubs against clothing. Change the bandage every day. ? · Keep the area clean and dry. Other instructions ? · If you breastfeed your baby, you may be more comfortable while you are healing if you place the baby so that he or she is not resting on your belly. Try tucking your baby under your arm, with his or her body along the side you will be feeding on. Support your baby's upper body with your arm. With that hand you can control your baby's head to bring his or her mouth to your breast. This is sometimes called the football hold. Follow-up care is a key part of your treatment and safety. Be sure to make and go to all appointments, and call your doctor if you are having problems. It's also a good idea to know your test results and keep a list of the medicines you take. When should you call for help? Call 911 anytime you think you may need emergency care. For example, call if: 
? · You passed out (lost consciousness). ? · You have chest pain, are short of breath, or cough up blood. ?Call your doctor now or seek immediate medical care if: 
? · You have pain that does not get better after you take pain medicine. ? · You have severe vaginal bleeding. ? · You are dizzy or lightheaded, or you feel like you may faint. ? · You have new or worse pain in your belly or pelvis. ? · You have loose stitches, or your incision comes open. ? · You have symptoms of infection, such as: 
¨ Increased pain, swelling, warmth, or redness. ¨ Red streaks leading from the incision. ¨ Pus draining from the incision. ¨ A fever. ? · You have symptoms of a blood clot in your leg (called a deep vein thrombosis), such as: 
¨ Pain in your calf, back of the knee, thigh, or groin. ¨ Redness and swelling in your leg or groin. ? Watch closely for changes in your health, and be sure to contact your doctor if: 
? · You do not get better as expected. Where can you learn more? Go to http://klaudia-grzegorz.info/. Enter M806 in the search box to learn more about \" Section: What to Expect at Home. \" Current as of: 2017 Content Version: 11.4 © 6837-9238 [employer]. Care instructions adapted under license by [employer] (which disclaims liability or warranty for this information). If you have questions about a medical condition or this instruction, always ask your healthcare professional. [address] any warranty or liability for your use of this information. [employer] Activation Thank you for requesting access to [employer]. Please follow the instructions below to securely access and download your online medical record. [employer] allows you to send messages to your doctor, view your test results, renew your prescriptions, schedule appointments, and more. How Do I Sign Up? 1. In your internet browser, go to [email or web address]. 2. Click on the First Time User? Click Here link in the Sign In box. You will see the New Member Sign Up page. 3. Enter your [employer] Access Code exactly as it appears below. You will not need to use this code after youve completed the sign-up process. If you do not sign up before the expiration date, you must request a new code. [employer] Access Code: [email or web address] Expires: 3/16/2018 10:37 AM (This is the date your [employer] access code will ) 4. Enter the last four digits of your Social Security Number (xxxx) and Date of Birth (mm/dd/yyyy) as indicated and click Submit. You will be taken to the next sign-up page. 5. Create a [employer] ID. This will be your [employer] login ID and cannot be changed, so think of one that is secure and easy to remember. 6. Create a [employer] password. You can change your password at any time. 7. Enter your Password Reset Question and Answer. This can be used at a later time if you forget your password. 8. Enter your e-mail address. You will receive e-mail notification when new information is available in [address] 9. Click Sign Up. You can now view and download portions of your medical record. 10. Click the Download Summary menu link to download a portable copy of your medical information. Additional Information If you have questions, please visit the Frequently Asked Questions section of the [employer] website at [email or web address] Remember, [employer] is NOT to be used for urgent needs. For medical emergencies, dial 911. Chart Review Routing History Recipient Method Report Sent By David Telles Provider Unknown, MD  
Patient not available to ask [address] Mail IP Auto Routed Notes Red Fitzpatrick MD [[id number]] 12/16/2017  9:53 AM 12/16/2017

## 2017-12-12 NOTE — IP AVS SNAPSHOT
[address] [facility] 
[phone] Patient: Ivy Monson MRN: [id number] KIV:[id number] About your hospitalization You were admitted on:  December 12, 2017 You last received care in the:  [facility] You were discharged on:  December 16, 2017 Why you were hospitalized Your primary diagnosis was:  Not on File Your diagnoses also included:  Pregnant, Oligohydramnios, Thrombocytopenia Affecting Pregnancy (Hcc), Gestational Hypertension Things You Need To Do (next 8 weeks) Follow up with PROVIDER UNKNOWN Where:  Patient not available to ask Discharge Orders None A check katt indicates which time of day the medication should be taken. My Medications STOP taking these medications   
 progesterone 200 mg capsule Commonly known as:  PROMETRIUM TYLENOL EXTRA STRENGTH 500 mg tablet Generic drug:  acetaminophen TAKE these medications as instructed Instructions Each Dose to Equal  
 Morning Noon Evening Bedtime  
 ibuprofen 800 mg tablet Commonly known as:  MOTRIN Your last dose was: Your next dose is: Take 1 Tab by mouth every eight (8) hours as needed for Pain. 800 mg NIFEdipine ER 30 mg ER tablet Commonly known as:  PROCARDIA XL Your last dose was: Your next dose is: Take 1 Tab by mouth daily. 30 mg  
    
   
   
   
  
 oxyCODONE-acetaminophen 5-325 mg per tablet Commonly known as:  PERCOCET Your last dose was: Your next dose is: Take 2 Tabs by mouth every six (6) hours as needed. Max Daily Amount: 8 Tabs. 2 Tab PRENATAL #118-IRON-FOLIC ACID PO Your last dose was: Your next dose is: Take 1 Tab by mouth daily. 1 Tab Where to Get Your Medications Information on where to get these meds will be given to you by the nurse or doctor. ! Ask your nurse or doctor about these medications  
  ibuprofen 800 mg tablet NIFEdipine ER 30 mg ER tablet  
 oxyCODONE-acetaminophen 5-325 mg per tablet Discharge Instructions  Section: What to Expect at [facility] Your Recovery A  section, or , is surgery to deliver your baby through a cut, called an incision, that the doctor makes in your lower belly and uterus. You may have some pain in your lower belly and need pain medicine for 1 to 2 weeks. You can expect some vaginal bleeding for several weeks. You will probably need about 6 weeks to fully recover. It is important to take it easy while the incision is healing. Avoid heavy lifting, strenuous activities, or exercises that strain the belly muscles while you are recovering. Ask a family member or friend for help with housework, cooking, and shopping. This care sheet gives you a general idea about how long it will take for you to recover. But each person recovers at a different pace. Follow the steps below to get better as quickly as possible. How can you care for yourself at home? Activity ? · Rest when you feel tired. Getting enough sleep will help you recover. ? · Try to walk each day. Start by walking a little more than you did the day before. Bit by bit, increase the amount you walk. Walking boosts blood flow and helps prevent pneumonia, constipation, and blood clots. ? · Avoid strenuous activities, such as bicycle riding, jogging, weightlifting, and aerobic exercise, for 6 weeks or until your doctor says it is okay. ? · Until your doctor says it is okay, do not lift anything heavier than your baby. ? · Do not do sit-ups or other exercises that strain the belly muscles for 6 weeks or until your doctor says it is okay. ? · Hold a pillow over your incision when you cough or take deep breaths. This will support your belly and decrease your pain. ? · You may shower as usual. Pat the incision dry when you are done. ? · You will have some vaginal bleeding. Wear sanitary pads. Do not douche or use tampons until your doctor says it is okay. ? · Ask your doctor when you can drive again. ? · You will probably need to take at least 6 weeks off work. It depends on the type of work you do and how you feel. ? · Ask your doctor when it is okay for you to have sex. Diet ? · You can eat your normal diet. If your stomach is upset, try bland, low-fat foods like plain rice, broiled chicken, toast, and yogurt. ? · Drink plenty of fluids (unless your doctor tells you not to). ? · You may notice that your bowel movements are not regular right after your surgery. This is common. Try to avoid constipation and straining with bowel movements. You may want to take a fiber supplement every day. If you have not had a bowel movement after a couple of days, ask your doctor about taking a mild laxative. ? · If you are breastfeeding, do not drink any alcohol. Medicines ? · Your doctor will tell you if and when you can restart your medicines. He or she will also give you instructions about taking any new medicines. ? · If you take blood thinners, such as warfarin (Coumadin), clopidogrel (Plavix), or aspirin, be sure to talk to your doctor. He or she will tell you if and when to start taking those medicines again. Make sure that you understand exactly what your doctor wants you to do. ? · Take pain medicines exactly as directed. ¨ If the doctor gave you a prescription medicine for pain, take it as prescribed. ¨ If you are not taking a prescription pain medicine, ask your doctor if you can take an over-the-counter medicine.   
? · If you think your pain medicine is making you sick to your stomach: 
 ¨ Take your medicine after meals (unless your doctor has told you not to). ¨ Ask your doctor for a different pain medicine. ? · If your doctor prescribed antibiotics, take them as directed. Do not stop taking them just because you feel better. You need to take the full course of antibiotics. Incision care ? · If you have strips of tape on the incision, leave the tape on for a week or until it falls off.  
? · Wash the area daily with warm, soapy water, and pat it dry. Don't use hydrogen peroxide or alcohol, which can slow healing. You may cover the area with a gauze bandage if it weeps or rubs against clothing. Change the bandage every day. ? · Keep the area clean and dry. Other instructions ? · If you breastfeed your baby, you may be more comfortable while you are healing if you place the baby so that he or she is not resting on your belly. Try tucking your baby under your arm, with his or her body along the side you will be feeding on. Support your baby's upper body with your arm. With that hand you can control your baby's head to bring his or her mouth to your breast. This is sometimes called the football hold. Follow-up care is a key part of your treatment and safety. Be sure to make and go to all appointments, and call your doctor if you are having problems. It's also a good idea to know your test results and keep a list of the medicines you take. When should you call for help? Call 911 anytime you think you may need emergency care. For example, call if: 
? · You passed out (lost consciousness). ? · You have chest pain, are short of breath, or cough up blood. ?Call your doctor now or seek immediate medical care if: 
? · You have pain that does not get better after you take pain medicine. ? · You have severe vaginal bleeding. ? · You are dizzy or lightheaded, or you feel like you may faint. ? · You have new or worse pain in your belly or pelvis. ? · You have loose stitches, or your incision comes open. ? · You have symptoms of infection, such as: 
¨ Increased pain, swelling, warmth, or redness. ¨ Red streaks leading from the incision. ¨ Pus draining from the incision. ¨ A fever. ? · You have symptoms of a blood clot in your leg (called a deep vein thrombosis), such as: 
¨ Pain in your calf, back of the knee, thigh, or groin. ¨ Redness and swelling in your leg or groin. ? Watch closely for changes in your health, and be sure to contact your doctor if: 
? · You do not get better as expected. Where can you learn more? Go to http://klaudia-grzegorz.info/. Enter M806 in the search box to learn more about \" Section: What to Expect at Home. \" Current as of: 2017 Content Version: 11.4 © 9005-6191 [employer]. Care instructions adapted under license by [employer] (which disclaims liability or warranty for this information). If you have questions about a medical condition or this instruction, always ask your healthcare professional. [address] any warranty or liability for your use of this information. Real Time Wine Activation Thank you for requesting access to Real Time Wine. Please follow the instructions below to securely access and download your online medical record. Real Time Wine allows you to send messages to your doctor, view your test results, renew your prescriptions, schedule appointments, and more. How Do I Sign Up? 1. In your internet browser, go to [email or web address]. 2. Click on the First Time User? Click Here link in the Sign In box. You will see the New Member Sign Up page. 3. Enter your Real Time Wine Access Code exactly as it appears below. You will not need to use this code after youve completed the sign-up process. If you do not sign up before the expiration date, you must request a new code. Real Time Wine Access Code: [email or web address] Expires: 3/16/2018 10:37 AM (This is the date your Aquicore access code will ) 4. Enter the last four digits of your Social Security Number (xxxx) and Date of Birth (mm/dd/yyyy) as indicated and click Submit. You will be taken to the next sign-up page. 5. Create a Aquicore ID. This will be your Aquicore login ID and cannot be changed, so think of one that is secure and easy to remember. 6. Create a Aquicore password. You can change your password at any time. 7. Enter your Password Reset Question and Answer. This can be used at a later time if you forget your password. 8. Enter your e-mail address. You will receive e-mail notification when new information is available in [address] 9. Click Sign Up. You can now view and download portions of your medical record. 10. Click the Download Summary menu link to download a portable copy of your medical information. Additional Information If you have questions, please visit the Frequently Asked Questions section of the Aquicore website at [email or web address] Remember, Aquicore is NOT to be used for urgent needs. For medical emergencies, dial 911. Aquicore Announcement We are excited to announce that we are making your provider's discharge notes available to you in Aquicore. You will see these notes when they are completed and signed by the physician that discharged you from your recent hospital stay. If you have any questions or concerns about any information you see in Aquicore, please call the Health Information Department where you were seen or reach out to your Primary Care Provider for more information about your plan of care. Introducing [facility]! [employer] introduces Aquicore patient portal. Now you can access parts of your medical record, email your doctor's office, and request medication refills online. 1. In your internet browser, go to [email or web address] 2. Click on the First Time User? Click Here link in the Sign In box. You will see the New Member Sign Up page. 3. Enter your Live Gamer Access Code exactly as it appears below. You will not need to use this code after youve completed the sign-up process. If you do not sign up before the expiration date, you must request a new code. · Live Gamer Access Code: [email or web address] Expires: 3/16/2018 10:37 AM 
 
4. Enter the last four digits of your Social Security Number (xxxx) and Date of Birth (mm/dd/yyyy) as indicated and click Submit. You will be taken to the next sign-up page. 5. Create a Live Gamer ID. This will be your Live Gamer login ID and cannot be changed, so think of one that is secure and easy to remember. 6. Create a Live Gamer password. You can change your password at any time. 7. Enter your Password Reset Question and Answer. This can be used at a later time if you forget your password. 8. Enter your e-mail address. You will receive e-mail notification when new information is available in [address] 9. Click Sign Up. You can now view and download portions of your medical record. 10. Click the Download Summary menu link to download a portable copy of your medical information. If you have questions, please visit the Frequently Asked Questions section of the Live Gamer website. Remember, Live Gamer is NOT to be used for urgent needs. For medical emergencies, dial 911. Now available from your iPhone and Android! Providers Seen During Your Hospitalization Provider Specialty Primary office phone Clarisa Luisana, Jorge A [facility] Gynecology [phone] Your Primary Care Physician (PCP) Primary Care Physician Office Phone Office Fax UNKNOWN, PROVIDER ** None ** ** None ** You are allergic to the following No active allergies Recent Documentation Height Weight Breastfeeding? BMI OB Status Smoking Status 1.6 m 98.4 kg Unknown 38.44 kg/m2 Recent pregnancy Never Smoker Emergency Contacts Name Discharge Info Relation Home Work Mobile Jaden Hart DISCHARGE CAREGIVER [3] Spouse [3] [phone] Patient Belongings The following personal items are in your possession at time of discharge: 
  Dental Appliances: None  Visual Aid: Glasses      Home Medications: None   Jewelry: Ring  Clothing: Dress, Footwear, Pants, Shirt, Socks, Undergarments    Other Valuables: Cell Phone Please provide this summary of care documentation to your next provider. Signatures-by signing, you are acknowledging that this After Visit Summary has been reviewed with you and you have received a copy. Patient Signature:  ____________________________________________________________ Date:  ____________________________________________________________  
  
[facility] Provider Signature:  ____________________________________________________________ Date:  ____________________________________________________________

## 2017-12-12 NOTE — ANESTHESIA PREPROCEDURE EVALUATION
Anesthetic History   No history of anesthetic complications            Review of Systems / Medical History  Patient summary reviewed, nursing notes reviewed and pertinent labs reviewed    Pulmonary  Within defined limits                 Neuro/Psych   Within defined limits           Cardiovascular    Hypertension: well controlled              Exercise tolerance: >4 METS  Comments: PIH   GI/Hepatic/Renal  Within defined limits              Endo/Other        Obesity    Comments:  Thrombocytopenia   Other Findings   Comments: Previous  Section  Oligohydramnios           Physical Exam    Airway  Mallampati: II  TM Distance: > 6 cm  Neck ROM: normal range of motion   Mouth opening: Normal     Cardiovascular  Regular rate and rhythm,  S1 and S2 normal,  no murmur, click, rub, or gallop             Dental  No notable dental hx       Pulmonary  Breath sounds clear to auscultation               Abdominal  GI exam deferred       Other Findings            Anesthetic Plan    ASA: 2  Anesthesia type: spinal      Post-op pain plan if not by surgeon: intrathecal opiates    Induction: Intravenous  Anesthetic plan and risks discussed with: Patient

## 2017-12-12 NOTE — H&P
EDC:2017  EGA: 40 weeks, 5 days      Primary Provider:  Edilma Vick MD      History of Present Illness:  Presents to L&D this morning after several hours of painful uterine contractions overnight. They have slowed some since her arrival to L&D this morning. Of note, her pregnancy was comlicated by a history of incompetent cervix, and she had a cerclage and received Pyote weekly until 36 weeks. She has had a prior , and desires TOLAC and   We have reviewed the risks of this extensively and she understands. She denies headache, SOB, visal changes, etc.  She ahd been having daily heacdaches      Patient's Prenatal Care with Doctor of Record Edilma Vick MD Notable For -    GBS positive RX in labor  Prev LTCS desires  consent 10/20/17  High risk pregnancy AMA multigravida  High risk pregnancy history of PTB/incompetent cervix  Cervical incompetence, cerclage and anatoly at 14 weeks, completed at 36 weeks, cerclage removed 17   lab screening  Obesity in pregnancy BMI>34.99-FS ok  Previous miscarriages          Impression & Recommendations:    Problem # 1:  Gestational hypertension (ICD-642.33) (RUV64-W28.3)  31 yo  at 40 5.7 EGA, now with persitently mild range BPs on L&D. Labs are nroal with exception of platelets which are 160F. At her 28 week check, they were 150k. PETE is 3.5  Discussed at length the need for delivery in the face of each of these problems, now past term. Discussed her desire for TOLAC vs. the problem of her unfavorable cervix, which would at least require ripening for success, and discussed the dangers a protratced peripureum may present. I recmomended proceeding with  today as opposed to ripening for 12 hours followed by induction of unknown period of time. She agrees with that recommednation. We discussed risks and benefits of CS and will go ahead today with this intervention.       Problem # 2:  Thrombocytopenia pregnant (AKM-005.36) (BUB32-K64.947)  As above    Problem # 3:  Oligohydramnios ____ (SOJ-272.55) (ATQ92-M34.03x0)  As above    Other Orders:  Inpatient Admission - High (CPT-AdmitF)      Past Pregnancy History      :  7     Term Births:  0     Premature Births: 1     Living Children: 1     Para:   1     Prev : 1     Aborta:  5     Elect. Ab:  0     Spont. Ab:  5     Ectopics:  0    Pregnancy # 5     Delivery date:   2015     Weeks Gestation: 31/6      labor:   yes     Delivery type:        Anesthesia type:   epidural     Delivery location:   [address]     Infant Sex:  Male     Birth weight:  3lb 4oz     Name:  Rafi Roles     Comments:  PTL while on bedrest in hospital, denies PPROM. Low Transverse  Section for breech although vtx upon delivery. Cerclage at 15wks. Past Medical History:     Reviewed history from 2016 and no changes required:        none    Past Surgical History:     Reviewed history from 2017 and no changes required:        C section         2 D&C  and         cerclage        [id number] Cervical cerclage, no path. needs follow up visit and cervical length this week.      Family History Summary:      Reviewed history Last on 2017 and no changes required:2017      General Comments - FH:  Family history transferred to [address] [place] [address]     Social History:     Reviewed history and no changes required:      Previous Tobacco Use: Signed On - 2017  Smoked Tobacco Use:  Never smoker  Smokeless Tobacco Use:  Never  Passive smoke exposure:  no  Drug use:  no  HIV high-risk behavior:  no  Caffeine use:  1 drinks per day    Previous Alcohol Use: Signed On - 2017  Alcohol use:  no  Exercise:  no  Seatbelt use:  100 %  Sun Exposure:  occasionally    PAP Smear History:     Date of Last PAP Smear:  03/15/2017      Review of Systems        See HPI    Allergies      Medications Removed from Medication List        Flowsheet View for Follow-up Visit     Estimated weeks of        gestation:  40 5/7     Blood pressure: 144 / 90     FHR:   135     Fetal position:  cephalic     Cx Dilation:  1     Cx Effacement: 80%     Cx Station:  -3      Vital Signs    Blood Pressure: 144 / 90  FHT Descrip:    mod karen, accels, no decels  Contractions:  yes  UC Frequency:  Irregular    NST:   reactive         Physical Exam     General           General appearance:  no acute distress    Head           Inspection:   normal    Eyes           External:   EOM intact    ENT           Dental:   adequate dentition    Chest           Lungs:  clear to auscultation          Heart:  regular rate and rhythm    Extremeties           Extremeties:  0 edema    Neurological           Reflexes:  2+ and symmetric with no pathological reflexes    Psych           Orientation:  oriented to time, place, and person          Mood:  no appearance of anxiety, depression, or agitation    Lymph           Inguinal:  no inguinal adenopathy    Skin           Inspection:  no rashes, suspicious lesions, or ulcerations    Abdomen           Abdomen:  gravid    Pelvic Exam           EGBUS:  no lesions          Vagina:  normal appearing without lesions or discharge          Uterus:  gravid          Cervix:  no lesions or discharge                  Dilation: : 1                  Effacement:  80%                  Station:  -3                  Presentation:  cephalic    Other Physical Exam Findings           bedside US performed:  Single living IUP, cepahlic, ant/fundal placenta. 4QAFI 3.4 cm. Impression & Recommendations:    Problem # 1:  Gestational hypertension (ICD-642.33) (GSY56-T15.3)  29 yo  at 40 5.7 EGA, now with persitently mild range BPs on L&D. Labs are nroal with exception of platelets which are 606F. At her 28 week check, they were 150k. PETE is 3.5  Discussed at length the need for delivery in the face of each of these problems, now past term.   Discussed her desire for TOLAC vs. the problem of her unfavorable cervix, which would at least require ripening for success, and discussed the dangers a protratced peripureum may present. I recmomended proceeding with  today as opposed to ripening for 12 hours followed by induction of unknown period of time. She agrees with that recommednation. We discussed risks and benefits of CS and will go ahead today with this intervention. Problem # 2:  Thrombocytopenia pregnant (CXB-784.29) (ZUQ75-Z81.113)  As above    Problem # 3:  Oligohydramnios ____ (ICD-658.03) (WWT84-T43.03x0)  As above    Other Orders:  Inpatient Admission - High (CPT-AdmitF)      Medications (at conclusion of this visit)    2017 PRENATAL VITAMIN TABLET (PRENATAL VIT-FE FUMARATE-FA TABS)           LABORATORY DATA   TEST DATE RESULT   Group B Strep culture 2017 Positive                                   (Group B Strep Culture Result Field)   Blood Type 2017 O                                             (Blood Type Result Field)   Rh 2017 Positive                                   (Rh Result Field)   Rhogam Inj Given 2016 *   Tdap Vaccine Given 2017 Vacc.  [facility]   Antibody Screen 2017 Negative   Rubella  [facility] Reference Ranges On or After 3/10/14                  <0.90              Non-immune      0.90 - 0.99     Equivocal      >0.99              Immune    [facility] Reference Ranges  Before 3/10/14           <5                 Non-immune             5 - 9               Equivocal            >9                 Immune  [facility] Reference Ranges       < Or = 0.90       Negative             0.91-1.09          Equivocal            > Or = 1.10       Positive   2017     2.74     TPA (T Pallidum Antibodies) 2017 Negative   Serology (RPR) 2016 *   HBsAg 2017 Negative   HIV 2017 Non Reactive   Hemoglobin 2017 11.6   Hematocrit 2017 33.1   Platelets 5943 150 X10E3/UL   TSH 2016 *   Urine Culture 05/12/2017 Negative   GC DNA Probe 05/12/2017 Negative   Chlamydia DNA 05/12/2017 Negative   PAP 03/15/2017 NIL   Flu Vaccine Given 12/05/2017 Vacc.  [address]   HGBA1C 07/02/2016 *   HGB Electro 05/12/2017 AA   T4, Free 07/02/2016 *   BG Fasting 07/02/2016 *   GTT 1H 50G 09/19/2017 89   GTT 1H 100G 07/02/2016 *   GTT 2H 100G 07/02/2016 *   GTT 3H 100G 07/02/2016 *   Glucose Plasma 07/02/2016 *   CF Accept or Decline 07/02/2016 *   CF Screen Result 05/12/2017 Negative   Nuchal Trans 05/30/2017 Normal   AFP Only 06/16/2017 *Screen Negative*   Tetra     AFP Serum 07/02/2016 *   CVS 07/02/2016 *   AFP Amniotic 07/02/2016 *   Amnio Karyo 07/02/2016 *   FISH 07/02/2016 *   GC Culture 07/02/2016 *   Chlamydia Cult 07/02/2016 *   Ureaplasma     Mycoplasma     WBC 05/12/2017 5.8 X10E3/UL   RBC 05/12/2017 4.52 X10E6/UL   MCV 05/12/2017 90   MCH 05/12/2017 30.3   MCHC RBC 05/12/2017 33.7     ULTRASOUND DATA   TEST DATE RESULT   Estimated Fetal Weight 10/20/2017 0045.39693054^8001 g&grams                                     Weight % 10/20/2017 42^42% %&percent                                                PETE 10/20/2017 14.96^15 cm&centimeters                    BPP 10/20/2017 8^8 [n/a]&Not applicable   Cervical Length (mm) 09/26/2016 30.000           Electronically signed by Duyen Coley MD on 12/12/2017 at 10:11 AM    ________________________________________________________________________

## 2017-12-12 NOTE — PROGRESS NOTES
Late Entry:  Patient arrived for Obstetric evaluation/ induction from Dr. Lauren Barnes  verbal orders obtained. Receive pt ambulatory with her  and admit to room 3168 . Patient given  a specimen cup along with instructions for clean catch U/A and to change in to a pt gown. Prenatal medications and allergies reviewed  Consents reviewed and witnessed, as the patient acknowledged w/signature. Routine L&D triage / Birthplace procedures explained including how to contact nurse,  and to call for nursing assistance prior to getting OOB to use bathroom. The call bell is within reach. External fetal monitor and toco applied,FHT's: 135. 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 She is a  A 5 L,1 EDC:  17. Previous C/Sectin [facility] to [facility]. She denies:bleeding, ruptured membranes, vaginal discharge,elevated blood pressure, headache today but has daily head aches, dizziness, visual changes,epigastric pain, edema, DM, nausea,vomiting, diarrhea, shortness of breath,chest pain, cough,urinary urgency/frequency, dysuria,  fever/chills, or flank pain. Numeric pain scale explained to patient, pain scale rating;  1/10    0840 Dr.Berkle Aponte to assess the patient and to discuss the POCnotified of patient's arrival and initial assessment. SVE FT/50/ \"high\" by Dr Hanna Rose 20g IV started in left hand, labs drawn for preeclampsia and urine sent to lab.    3504  Dr Lauren Barnes in [facility] off, ultra sound  Done. POC for repeat C/Section at 1200.    1110  Abdomen clipped and wipe with HCG wipes. IV bolus for spinal started. 1215  EFM off, ambulatory to OR for repeat C/Section. 1545 Dr Cathy Bella notified that the patient's diastolic B/P has been in the high 80- low 90 range. Pt's bleeding has been moderate to large with several PP checks.

## 2017-12-12 NOTE — H&P
EDC:2017  EGA: 39 weeks, 1 days      Primary Provider:  Em Kaur MD      History of Present Illness:  Presents for schedueld repeat CS. Pregnancy overall uncomplicated        Patient's Prenatal Care with Doctor of Record James Knight MD Notable For -    Viral URI syndrome pregnant  Rule out ruptured membranes  *Note: Repeat cs 17 [facility] 800am  Previous oligo pregnant  Anxiety/Mood swings Complicating Pregnancy  Abdominal pain epigastric pregnant  Headache pregnant  Normal pregnancy multigravida   lab screening  Prev LTCS desires repeat  Threatened AB          Impression & Recommendations:    Problem # 1:  Normal pregnancy multigravida (ICD-V22.1) (NET90-A45.74)  35 yo  at 44  for repeat CS. Gent/clinda (ceclor allergy)  Risks and beenifts reviewed. Other Orders:  Inpatient Admission - High (CPT-AdmitF)      Past Pregnancy History      :  3     Term Births:  1     Premature Births: 0     Living Children: 1     Para:   1     Prev : 1     Aborta:  1     Elect. Ab:  1     Spont. Ab:  0    Pregnancy # 1     Comments:  TAB     Pregnancy # 2     Delivery date:   2015     Weeks Gestation: 38 +     Delivery type:        Delivery location:   [facility]     Infant Sex:  Female     Name:  Matthew Zhao     Comments:  oligo; was told she had \"no fluid\" at delivery        Past Medical History:     Reviewed history from 2017 and no changes required:        Anxiety/depression        Migraines            Past Surgical History:     Reviewed history from 2017 and no changes required:         Tonsillectomy             Family History Summary:      Reviewed history Last on 2017 and no changes required:2017        Social History:     Reviewed history from 2017 and no changes required:                        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 Previous Tobacco Use: Signed On - 2017  Smoked Tobacco Use:  Former smoker     Cigarettes:  Yes -- n/a pack(s) per day,      Year quit:          Years Since Last Quit:  0 years, 11 months, 11 days  Caffeine use:  <1 drinks per day    Previous Alcohol Use: Signed On - 2017  Alcohol use:  no  Exercise:  yes     Type of Exercise:  walking  Seatbelt use:  100 %    PAP Smear History:     Date of Last PAP Smear:  2017      Review of Systems        See HPI    Allergies    CECLOR (Moderate)      Medications Removed from Medication List        Flowsheet View for Follow-up Visit     Estimated weeks of        gestation:  39 1/7          Physical Exam     General           General appearance:  no acute distress    Head           Inspection:   normal    Eyes           External:   EOM intact    ENT           Dental:   adequate dentition    Chest           Lungs:  clear to auscultation          Heart:  regular rate and rhythm    Extremeties           Extremeties:  0 edema    Neurological           Reflexes:  2+ and symmetric with no pathological reflexes    Psych           Orientation:  oriented to time, place, and person          Mood:  no appearance of anxiety, depression, or agitation    Lymph           Inguinal:  no inguinal adenopathy    Skin           Inspection:  no rashes, suspicious lesions, or ulcerations    Abdomen           Abdomen:  gravid    Pelvic Exam           EGBUS:  no lesions          Vagina:  normal appearing without lesions or discharge          Uterus:  gravid          Cervix:  no lesions or discharge            Impression & Recommendations:    Problem # 1:  Normal pregnancy multigravida (ICD-V22.1) (ZNU03-L41.58)  35 yo  at 44 1/7 for repeat CS. Gent/clinda (ceclor allergy)  Risks and beenifts reviewed.       Other Orders:  Inpatient Admission - High (CPT-AdmitF)      Medications (at conclusion of this visit)    2017 ZOFRAN ODT 4 MG ORAL TABLET DISINTEGRATING (ONDANSETRON) one tab orally every 4 to 6 hours as needed for nausea  11/22/2017 FIORICET -40 MG ORAL CAPSULE (BUTALBITAL-APAP-CAFFEINE) 1 po q 4-6 prn for HA  09/11/2017 LEXAPRO 10 MG ORAL TABLET (ESCITALOPRAM OXALATE) 1 tablet orally daily  09/11/2017 TUMS TABLET CHEWABLE (CALCIUM CARBONATE ANTACID CHEW) Prescribed by non [address] Rosa Hernadez MD  05/02/2017 PRENATAL TABLET (PRENATAL VIT-FE FUMARATE-FA TABS) Prescribed by non [address] Rosa Hernadez MD          LABORATORY DATA   TEST DATE RESULT   Group B Strep culture 11/20/2017 Negative                                   (Group B Strep Culture Result Field)   Blood Type 05/24/2017 O                                             (Blood Type Result Field)   Rh 05/24/2017 Positive                                   (Rh Result Field)   Rhogam Inj Given     Tdap Vaccine Given 09/26/2017 Vacc. [address]   Antibody Screen 09/26/2017 Negative   Rubella  [facility] Reference Ranges On or After 3/10/14                  <0.90              Non-immune      0.90 - 0.99     Equivocal      >0.99              Immune    [facility] Reference Ranges  Before 3/10/14           <5                 Non-immune             5 - 9               Equivocal            >9                 Immune  [facility] Reference Ranges       < Or = 0.90       Negative             0.91-1.09          Equivocal            > Or = 1.10       Positive   05/24/2017     4.91     TPA (T Pallidum Antibodies) 09/26/2017 Negative   Serology (RPR)     HBsAg 05/24/2017 Negative   HIV 05/24/2017 Non Reactive   Hemoglobin 09/26/2017 12.2   Hematocrit 09/26/2017 35.0   Platelets 98/75/0078 165 X10E3/UL   TSH 09/11/2017 2.680   Urine Culture 05/24/2017 Negative   GC DNA Probe 05/24/2017 Negative   Chlamydia DNA 05/24/2017 Negative   PAP 05/24/2017 NIL   Flu Vaccine Given 09/26/2017 Vacc.  [facility]   HGBA1C     HGB Electro     T4, Free 09/11/2017 0.95   BG Fasting     GTT 1H 50G 09/26/2017 122   GTT 1H 100G     GTT 2H 100G     GTT 3H 100G     Glucose Plasma     CF Accept or Decline     CF Screen Result 05/24/2017 Negative   Nuchal Trans 06/08/2017 Normal   AFP Only 07/07/2017 *Screen Negative*   Tetra     AFP Serum     CVS     AFP Amniotic     Amnio Karyo     FISH     GC Culture     Chlamydia Cult     Ureaplasma     Mycoplasma     WBC 09/11/2017 10.7 X10E3/UL   RBC 09/11/2017 4.11 X10E6/UL   MCV 09/11/2017 90   MCH 09/11/2017 30.4   MCHC RBC 09/11/2017 33.9     ULTRASOUND DATA   TEST DATE RESULT   Estimated Fetal Weight 10/09/2017 8371.35495076^3300 g&grams                                     Weight % 10/09/2017 30^30% %&percent                                                PETE 10/09/2017 10.34^34.3 cm&centimeters                    BPP     Cervical Length (mm)             Electronically signed by Marline Maciel MD on 12/12/2017 at 8:05 AM    ________________________________________________________________________

## 2017-12-12 NOTE — OP NOTES
Operative Note    Name: Quinton Story   Medical Record Number: [id number]   YOB: 1980  Today's Date: 2017      Pre-operative Diagnosis: IUP 40 5/7 PREVIOUS C/SECTION IN LABOR    Post-operative Diagnosis: same    Operation: Low Cervical Transverse Procedure(s):   SECTION    Surgeon(s):  MD Madeleine Price MD    Anesthesia: Spinal    Prophylactic Antibiotics: Ancef  DVT Prophylaxis: Sequential Compression Devices         Fetal Description: powell     Birth Information:   Information for the patient's :  Johanny Pro [[id number]]   Delivery of a 3.195 kg Female [1] infant on 2017 at 12:47 PM. Apgars were 9 and 9. Umbilical Cord:     Umbilical Cord Events:     Placenta:  removal with  appearance. Amniotic Fluid Volume: Moderate     Amniotic Fluid Description:  Clear        Umbilical Cord: 3 vessels present    Placenta:  spontaneous    Specimens:none           Complications:  none    Procedure Detail:      After proper patient identification and consent, the patient was taken to the operating room, where spinal anesthesia was administered and found to be adequate. Rai catheter had been placed using sterile technique. The patient was prepped and draped in the normal sterile fashion. The abdomen was entered using the Pfannenstiel technique. The peritoneum was entered sharply well superior to the bladder without any apparent injury. A low transverse uterine incision was made with the scalpel and extended with blunt finger dissection. Amniotomy was performed and the fluid was medium amount clear. The babys head was then delivered atraumatically. The nose and mouth were suctioned. The cord was clamped and cut and the baby was handed off to Nursing staff in attendance. Placenta was then removed from the uterus. The uterus was curettaged with a moist lap pad and cleared of all clots and debris.  The uterine incision was closed with 0 vicryl, double layer  in running locking fashion with good hemostasis assured. The posterior cul-de-sac and paracolic gutters were irrigated with warm normal saline. Good hemostasis was again reassured. The anterior pelvis was irrigated with warm normal saline and good hemostasis was again reassured throughout. The peritoneum was reapproximated with 2-0 vicryl. The fascia was closed with 0 PDS in a running fashion. Good hemostasis was assured. The subcutaneous space was irrigated copiously and made hemostatic with Bovie cautery. The skin was closed with a subcuticular staple closure. The patient tolerated the procedure well. Sponge, lap, and needle counts were correct times three and the patient and baby were taken to recovery/postpartum room in stable condition.     Cherisse Najjar, MD  December 12, 2017  6:35 PM

## 2017-12-12 NOTE — LACTATION NOTE
This note was copied from a baby's chart. Attempted baby at breast. Nurse assessed short/cheyenne right nipple and applied contact nipple shield/challenging to maintain seal over nipple. [facility] rounds, baby was in nbn for exam. Mother receiving comfort measures for N/V  Will continue to follow and support. 80 Baby returns from nbn. No feeding cues at this time. Placed at breast, skin to skin. Awakening techniques tried. Strong finger suckle assessment. Latched for a few good suckle/pause cycles then disinterested. 15 minutes of interactive time at breast with attempts. Cross cradle hold and left breast. Mom taught how to manually hand express her colostrum. Emphasized the importance of providing infant with valuable colostrum as infant rests skin to skin at breast. Aware to avoid extended periods of non-feeding. Taught the rationale behind this low tech but highly effective evidence based practice. Teaching and log initiated. Mother pumped 6 months with her 1st at 32 weeks. Wanted to pump longer but while returning to work/daily stress she lost her supply. Discussed outpatient resources when needed through [facility] &support group, prn. Expect success and daily progress with positioning and latching baby as she awakens. Will continue to follow and support.

## 2017-12-12 NOTE — PROGRESS NOTES
Delivery Summary  Patient: Eliot Cisneros             Circumcision:   NA-female  Additional Delivery Comments - noen      Information for the patient's :  August Maddox [[id number]]       Labor Events:    Labor: No   Rupture Date: 2017   Rupture Time: 12:47 PM   Rupture Type AROM   Amniotic Fluid Volume: Moderate    Amniotic Fluid Description: Clear None   Induction: None       Augmentation: None   Labor Events:       Cervical Ripening:     None     Delivery Events:  Episiotomy:     Laceration(s):       Repaired:      Number of Repair Packets:     Suture Type and Size:       Estimated Blood Loss (ml):  ml       Delivery Date: 2017    Delivery Time: 12:47 PM  Delivery Type: , Low Transverse  Sex:  Female     Gestational Age: 39w6d   Delivery Clinician:  Jordan Freitas  Living Status: Living   Delivery Location: [facility]12          APGARS  One minute Five minutes Ten minutes   Skin color: 1   1        Heart rate: 2   2        Grimace: 2   2        Muscle tone: 2   2        Breathin   2        Totals: 9   9            Presentation: Vertex    Position:        Resuscitation Method:  Tactile Stimulation;Suctioning-bulb     Meconium Stained: None      Cord Vessels: 3 Vessels      Cord Events:    Cord Blood Sent?:  No    Blood Gases Sent?:  No    Placenta:  Date/Time:  12:50 PM  Removal: Expressed      Appearance: Intact     [place] Measurements:  Birth Weight: 3.195 kg      Birth Length: 50.8 cm      Head Circumference: 35 cm      Chest Circumference: 32 cm     Abdominal Girth: 31 cm    Other Providers:   JUAN PABLO GIVENS;BIB NELSON;JAYSON JACOBS;DEN MENDEZ;BOSTAPH, PRASANNA L;SIMEON ROY, Obstetrician;Primary Nurse;Primary [place] Nurse; Anesthesiologist;Crna;Nursery Nurse           Cord pH:  none    Episiotomy:     Laceration(s):       Estimated Blood Loss (ml):     Labor Events  Method: None      Augmentation: None   Cervical Ripening:       None Hospital Problems  Date Reviewed: 2017          Codes Class Noted POA    Pregnant ICD-10-CM: Z34.90  ICD-9-CM: V22.2  2017 Yes        Oligohydramnios ICD-10-CM: O41.00X0  ICD-9-CM: 658.00  2017 Yes        Thrombocytopenia affecting pregnancy ([address] ICD-10-CM: O99.119, D69.6  ICD-9-CM: 649.30, 287.5  2017 Yes        Gestational hypertension ICD-10-CM: O13.9  ICD-9-CM: 642.30  2017 Unknown              Operative Vaginal Delivery - none    Group B Strep:   Lab Results   Component Value Date/Time    GrBStrep, External POSITIVE 2017     Information for the patient's :  Jose Alejandro Ch [[id number]]   No results found for: ABORH, PCTABR, PCTDIG, BILI, ABORHEXT, ABORH    No results found for: APH, APCO2, APO2, AHCO3, ABEC, ABDC, O2ST, EPHV, PCO2V, PO2V, HCO3V, EBEV, EBDV, SITE, RSCOM normal...

## 2017-12-12 NOTE — PROGRESS NOTES
Verbal bedside report given to SIDDHARTH Shetty RN,on [employer] in room 7192. Report consisted of patients Situation, Background, Assessment and Recommendations(SBAR). Information from the following report(s) SBAR, Kardex, Intake/Output, MAR and Recent Results were reviewed with the receiving nurse. Opportunity for questions and clarification was provided,care assumed. 1540 SIDDHARTH Shetty updated that this last PP check patient's bleeding was moderate rubra.

## 2017-12-13 LAB
BASOPHILS # BLD: 0 K/UL (ref 0–0.1)
BASOPHILS NFR BLD: 0 % (ref 0–1)
EOSINOPHIL # BLD: 0 K/UL (ref 0–0.4)
EOSINOPHIL NFR BLD: 0 % (ref 0–7)
ERYTHROCYTE [DISTWIDTH] IN BLOOD BY AUTOMATED COUNT: 13.4 % (ref 11.5–14.5)
HCT VFR BLD AUTO: 35.1 % (ref 35–47)
HGB BLD-MCNC: 12 G/DL (ref 11.5–16)
LYMPHOCYTES # BLD: 0.8 K/UL (ref 0.8–3.5)
LYMPHOCYTES NFR BLD: 8 % (ref 12–49)
MCH RBC QN AUTO: 30.3 PG (ref 26–34)
MCHC RBC AUTO-ENTMCNC: 34.2 G/DL (ref 30–36.5)
MCV RBC AUTO: 88.6 FL (ref 80–99)
MONOCYTES # BLD: 0.4 K/UL (ref 0–1)
MONOCYTES NFR BLD: 4 % (ref 5–13)
NEUTS SEG # BLD: 9.3 K/UL (ref 1.8–8)
NEUTS SEG NFR BLD: 88 % (ref 32–75)
PLATELET # BLD AUTO: 128 K/UL (ref 150–400)
RBC # BLD AUTO: 3.96 M/UL (ref 3.8–5.2)
WBC # BLD AUTO: 10.5 K/UL (ref 3.6–11)

## 2017-12-13 PROCEDURE — 74011250636 HC RX REV CODE- 250/636: Performed by: OBSTETRICS & GYNECOLOGY

## 2017-12-13 PROCEDURE — 65410000002 HC RM PRIVATE OB

## 2017-12-13 PROCEDURE — 74011250637 HC RX REV CODE- 250/637: Performed by: ANESTHESIOLOGY

## 2017-12-13 PROCEDURE — 85025 COMPLETE CBC W/AUTO DIFF WBC: CPT | Performed by: OBSTETRICS & GYNECOLOGY

## 2017-12-13 PROCEDURE — 74011250637 HC RX REV CODE- 250/637: Performed by: OBSTETRICS & GYNECOLOGY

## 2017-12-13 PROCEDURE — 36415 COLL VENOUS BLD VENIPUNCTURE: CPT | Performed by: OBSTETRICS & GYNECOLOGY

## 2017-12-13 RX ORDER — IBUPROFEN 400 MG/1
800 TABLET ORAL EVERY 8 HOURS
Status: DISCONTINUED | OUTPATIENT
Start: 2017-12-13 | End: 2017-12-16 | Stop reason: HOSPADM

## 2017-12-13 RX ADMIN — IBUPROFEN 600 MG: 600 TABLET, FILM COATED ORAL at 04:38

## 2017-12-13 RX ADMIN — IBUPROFEN 800 MG: 400 TABLET ORAL at 23:22

## 2017-12-13 RX ADMIN — Medication 10 ML: at 05:46

## 2017-12-13 RX ADMIN — SODIUM CHLORIDE, SODIUM LACTATE, POTASSIUM CHLORIDE, AND CALCIUM CHLORIDE 125 ML/HR: 600; 310; 30; 20 INJECTION, SOLUTION INTRAVENOUS at 05:46

## 2017-12-13 RX ADMIN — IBUPROFEN 600 MG: 600 TABLET, FILM COATED ORAL at 15:41

## 2017-12-13 NOTE — PROGRESS NOTES
Bedside and Verbal shift change report given to AMINA Tristan  (oncoming nurse) by GUANAKITO Moore (offgoing nurse). Report included the following information SBAR, Kardex, OR Summary, Intake/Output, MAR and Recent Results.

## 2017-12-13 NOTE — ROUTINE PROCESS
0430  Fundal check pt firm U-1 with moderate trickle of blood. Trickle stopped after fundal massage. Will continue to monitor. 0530  Pt ambulated to bathroom with standby assist of two staff members. Slight light headedness on first sitting up. When pt sat down on toilet pt expelled a grapefruit sized clot as well as moderate amount trickling blood into toilet. Pt ambulated back to bed with two staff members with no c/o of dizziness. Fundal check pt firm at U-1 and no further bleeding. Will continue to monitor.

## 2017-12-13 NOTE — PROGRESS NOTES
Post-Operative  Day 1    Yolis Hart     Assessment: Post-Op day 1, stable    Plan:   1. Routine post-operative care   2. Follow bp, mild range    Information for the patient's :  Tahir Rhodes [[id number]]   , Low Transverse   Patient doing well without significant complaint. Nausea and vomiting resolved, tolerating liquids, no flatus, fallon in place. Vitals:  Visit Vitals    /81 (BP 1 Location: Right arm, BP Patient Position: Sitting)    Pulse 63    Temp 98.1 °F (36.7 °C)    Resp 16    Ht 5' 3\" (1.6 m)    Wt 98.4 kg (217 lb)    LMP 2017    SpO2 97%    Breastfeeding Unknown    BMI 38.44 kg/m2     Temp (24hrs), Av.2 °F (36.8 °C), Min:97.7 °F (36.5 °C), Max:98.5 °F (36.9 °C)      Last 24hr Input/Output:    Intake/Output Summary (Last 24 hours) at 17 1811  Last data filed at 17 1630   Gross per 24 hour   Intake          2719.17 ml   Output             1800 ml   Net           919.17 ml          Exam:        Patient without distress. Lungs clear. Abdomen, bowel sounds present, soft, expected tenderness, fundus firm Wound dressing intact     Perineum normal lochia noted               Lower extremities are negative for swelling, cords or tenderness.     Labs:   Lab Results   Component Value Date/Time    WBC 10.5 2017 05:02 AM    WBC 7.1 2017 08:54 AM    HGB 12.0 2017 05:02 AM    HGB 12.5 2017 08:54 AM    HCT 35.1 2017 05:02 AM    HCT 35.2 2017 08:54 AM    PLATELET 183  05:02 AM    PLATELET 477  08:54 AM       Recent Results (from the past 24 hour(s))   CBC WITH AUTOMATED DIFF    Collection Time: 17  5:02 AM   Result Value Ref Range    WBC 10.5 3.6 - 11.0 K/uL    RBC 3.96 3.80 - 5.20 M/uL    HGB 12.0 11.5 - 16.0 g/dL    HCT 35.1 35.0 - 47.0 %    MCV 88.6 80.0 - 99.0 FL    MCH 30.3 26.0 - 34.0 PG    MCHC 34.2 30.0 - 36.5 g/dL    RDW 13.4 11.5 - 14.5 %    PLATELET 066 (L) 631 - 400 K/uL NEUTROPHILS 88 (H) 32 - 75 %    LYMPHOCYTES 8 (L) 12 - 49 %    MONOCYTES 4 (L) 5 - 13 %    EOSINOPHILS 0 0 - 7 %    BASOPHILS 0 0 - 1 %    ABS. NEUTROPHILS 9.3 (H) 1.8 - 8.0 K/UL    ABS. LYMPHOCYTES 0.8 0.8 - 3.5 K/UL    ABS. MONOCYTES 0.4 0.0 - 1.0 K/UL    ABS. EOSINOPHILS 0.0 0.0 - 0.4 K/UL    ABS.  BASOPHILS 0.0 0.0 - 0.1 K/UL

## 2017-12-13 NOTE — LACTATION NOTE
This note was copied from a baby's chart. Day 1 progress note  Well read and independent mother, following baby led cues x 13. Am wt assessed at -3.2%  3 wet and 3 stools. Initial latch scoring 7-5-8-7-7 due to post op limitations in mobility. Now on left breast, observed eager baby with latch of 9, frequent suckle pause cycles, confident mother. Stated she uses contact nipple shield on right side. May benefit from ac pumping when ready to try/cheyenne prior to latch. Encouraged to obtain a new insurance provided breast pump for prn uses. Expect success. Contact #'s provided to [facility] for outpatient questions or concerns.

## 2017-12-13 NOTE — PROGRESS NOTES
Duramorph Follow-Up Note    1 Day Post-Op sp Procedure(s):   SECTION. /86 (BP 1 Location: Right arm, BP Patient Position: Sitting)  Pulse 67  Temp 36.9 °C (98.4 °F)  Resp 16  Ht 5' 3\" (1.6 m)  Wt 98.4 kg (217 lb)  LMP 2017  SpO2 97%  Breastfeeding? Unknown  BMI 38.44 kg/m2. Patient is POD-1 S/P intrathecal duramorph. Pain is well controlled  Patient reports no headache, fever, weakness or numbness. Epidural/spinal tap site is clean, dry and intact. No obvious Anesthesia complications noted. Plan:    Pain management as per primary service.

## 2017-12-13 NOTE — PROGRESS NOTES
Bedside shift change report given to NIDIA Salcido RN (oncoming nurse) by AMINA Rodriguez (offgoing nurse). Report given with SBAR.

## 2017-12-13 NOTE — PROGRESS NOTES
Spoke to Dr. Sushma Iglesias concerning incision not closed entirely. Dr. Sushma Iglesias stated Sallie Antes is how incisons look when insorb is used. \"

## 2017-12-13 NOTE — ROUTINE PROCESS
Bedside shift change report given to GUANAKITO Randhawa (oncoming nurse) by SIDDHARTH Anen RN (offgoing nurse). Report included the following information SBAR.

## 2017-12-14 PROCEDURE — 74011250637 HC RX REV CODE- 250/637: Performed by: OBSTETRICS & GYNECOLOGY

## 2017-12-14 PROCEDURE — 65410000002 HC RM PRIVATE OB

## 2017-12-14 RX ORDER — OXYCODONE AND ACETAMINOPHEN 5; 325 MG/1; MG/1
2 TABLET ORAL
Status: DISCONTINUED | OUTPATIENT
Start: 2017-12-14 | End: 2017-12-16 | Stop reason: HOSPADM

## 2017-12-14 RX ADMIN — IBUPROFEN 800 MG: 400 TABLET ORAL at 07:05

## 2017-12-14 RX ADMIN — OXYCODONE HYDROCHLORIDE AND ACETAMINOPHEN 1 TABLET: 5; 325 TABLET ORAL at 13:58

## 2017-12-14 RX ADMIN — IBUPROFEN 800 MG: 400 TABLET ORAL at 14:47

## 2017-12-14 RX ADMIN — IBUPROFEN 800 MG: 400 TABLET ORAL at 23:14

## 2017-12-14 NOTE — LACTATION NOTE
This note was copied from a baby's chart. Infant  13 times  In 24 hours with 2 voids and one stool. Weight loss is -7.6%. LATCH scores of 9 and 9 are noted.

## 2017-12-14 NOTE — ROUTINE PROCESS
Bedside shift change report given to MILLER Berman RN  (oncoming nurse) by NIDIA Remy RN (offgoing nurse). Report included the following information SBAR, MAR and Recent Results.

## 2017-12-14 NOTE — PROGRESS NOTES
Bedside and Verbal shift change report given to DIAMANTE Jin  (oncoming nurse) by GUANAKITO May (offgoing nurse). Report included the following information SBAR, Kardex,OR Summary, Procedure Summary, Intake/Output, MAR and Recent Results.

## 2017-12-14 NOTE — PROGRESS NOTES
Post-Operative  Day 2    Yolis Hart     Assessment: Post-Op day 2, doing well  GHTN, BP stable  No meds   Platelets stable     Plan:   1. Routine post-operative care    Information for the patient's :  Jacob Fraire [[id number]]   , Low Transverse   Patient doing well without significant complaint. Nausea and vomiting resolved, tolerating diet, passing flatus, voiding and ambulating without difficulty. Vitals:  Visit Vitals    /84 (BP 1 Location: Right arm, BP Patient Position: Sitting)    Pulse 77    Temp 98.3 °F (36.8 °C)    Resp 16    Ht 5' 3\" (1.6 m)    Wt 98.4 kg (217 lb)    LMP 2017    SpO2 97%    Breastfeeding Unknown    BMI 38.44 kg/m2     Temp (24hrs), Av.2 °F (36.8 °C), Min:97.8 °F (36.6 °C), Max:98.6 °F (37 °C)        Exam:        Patient without distress. Abdomen, bowel sounds present, soft, expected tenderness, fundus firm                Wound incision clean, dry and intact               Lower extremities are negative for swelling, cords or tenderness. Labs:   Lab Results   Component Value Date/Time    WBC 10.5 2017 05:02 AM    WBC 7.1 2017 08:54 AM    HGB 12.0 2017 05:02 AM    HGB 12.5 2017 08:54 AM    HCT 35.1 2017 05:02 AM    HCT 35.2 2017 08:54 AM    PLATELET 014  05:02 AM    PLATELET 372 49/10/6348 08:54 AM       No results found for this or any previous visit (from the past 24 hour(s)).

## 2017-12-14 NOTE — PROGRESS NOTES
Bedside shift change report given to GUANAKITO Vazquez RN (oncoming nurse) by MILLER Bazan RN (offgoing nurse). Report included the following information SBAR.

## 2017-12-14 NOTE — PROGRESS NOTES
Spiritual Care Assessment/Progress Notes    Iliana Latham [id number]  [id number]    1980  30 y.o.  female    Patient Telephone Number: [phone] (home)   Congregational Affiliation: No preference   Language: English   Extended Emergency Contact Information  Primary Emergency Contact: [address] Phone: [phone]  Mobile Phone: [phone]  Relation: Spouse   Patient Active Problem List    Diagnosis Date Noted    Pregnant 12/12/2017    Oligohydramnios 12/12/2017    Thrombocytopenia affecting pregnancy ([facility] [address] 12/12/2017    Gestational hypertension 12/12/2017        Date: 12/14/2017       Level of Congregational/Spiritual Activity:  [x]         Involved in jayna tradition/spiritual practice    []         Not involved in jayna tradition/spiritual practice  [x]         Spiritually oriented    []         Claims no spiritual orientation    []         seeking spiritual identity  []         Feels alienated from Jehovah's witness practice/tradition  []         Feels angry about Jehovah's witness practice/tradition  [x]         Spirituality/Jehovah's witness tradition is a resource for coping at this time.   []         Not able to assess due to medical condition    Services Provided Today:  []         crisis intervention    []         reading Scriptures  [x]         spiritual assessment    [x]         prayer  []         empathic listening/emotional support  [x]         rites and rituals (cite in comments)  []         life review     []         Jehovah's witness support  []         theological development   []         advocacy  []         ethical dialog     [x]         blessing  []         bereavement support    [x]         support to family  []         anticipatory grief support   []         help with AMD  []         spiritual guidance    []         meditation      Spiritual Care Needs  []         Emotional Support  []         Spiritual/Congregational Care  []         Loss/Adjustment  []         Advocacy/Referral /Ethics  [x]         No needs expressed at               this time  []         Other: (note in               comments)  Spiritual Care Plan  []         Follow up visits with               pt/family  []         Provide materials  []         Schedule sacraments  []         Contact Community               Clergy  [x]         Follow up as needed  []         Other: (note in               comments)     Responded to request for baby dedication from pt's . Family conversing jovially upon arrival. [place]. Led in dedication service in presence of pt, , son, mother-in-law, and two other family members. Prayed over baby. Provided dedication certificate. Family expressed appreciation. KARRI Gray. Div

## 2017-12-14 NOTE — PROGRESS NOTES
Bedside shift change report given to [facility], RN (oncoming nurse) by AMINA Rodriguez (offgoing nurse). Report given with SBAR.

## 2017-12-15 PROCEDURE — 65410000002 HC RM PRIVATE OB

## 2017-12-15 PROCEDURE — 74011250637 HC RX REV CODE- 250/637: Performed by: OBSTETRICS & GYNECOLOGY

## 2017-12-15 RX ORDER — NIFEDIPINE 30 MG/1
30 TABLET, EXTENDED RELEASE ORAL DAILY
Status: DISCONTINUED | OUTPATIENT
Start: 2017-12-15 | End: 2017-12-16

## 2017-12-15 RX ORDER — NIFEDIPINE 30 MG/1
30 TABLET, EXTENDED RELEASE ORAL DAILY
Status: DISCONTINUED | OUTPATIENT
Start: 2017-12-15 | End: 2017-12-15

## 2017-12-15 RX ORDER — NIFEDIPINE 10 MG/1
20 CAPSULE ORAL
Status: COMPLETED | OUTPATIENT
Start: 2017-12-15 | End: 2017-12-15

## 2017-12-15 RX ADMIN — NIFEDIPINE 20 MG: 10 CAPSULE, LIQUID FILLED ORAL at 09:31

## 2017-12-15 RX ADMIN — NIFEDIPINE 30 MG: 30 TABLET, FILM COATED, EXTENDED RELEASE ORAL at 13:51

## 2017-12-15 RX ADMIN — IBUPROFEN 800 MG: 400 TABLET ORAL at 16:11

## 2017-12-15 RX ADMIN — IBUPROFEN 800 MG: 400 TABLET ORAL at 07:24

## 2017-12-15 NOTE — PROGRESS NOTES
Post-Operative  Day 3    Yolis Hart     Assessment: Post-Op day 3    Plan:   1. Routine post-operative care  2. Gestational htn bps severe range this am given short acting procardia 20mg with improvement in bps, will start on procardia XL 30. Keep in hospital for bp monitoring, titrate meds as needed  3. plts improved to 128 from 118 yesterday    Information for the patient's :  Amaryllis Median [[id number]]   , Low Transverse   Patient doing well without significant complaint. Nausea and vomiting resolved, tolerating liquids, passing flatus, voiding and ambulating without difficulty. Vitals:  Visit Vitals    BP (!) 143/91 (BP 1 Location: Left arm, BP Patient Position: Sitting)    Pulse 65    Temp 98.6 °F (37 °C)    Resp 18    Ht 5' 3\" (1.6 m)    Wt 98.4 kg (217 lb)    LMP 2017    SpO2 97%    Breastfeeding Unknown    BMI 38.44 kg/m2     Temp (24hrs), Av.5 °F (36.9 °C), Min:98.4 °F (36.9 °C), Max:98.6 °F (37 °C)        Exam:        Patient without distress. Abdomen, bowel sounds present, soft, expected tenderness, fundus firm                Wound incision clean, dry and intact               Lower extremities are negative for swelling, cords or tenderness. Labs:   Lab Results   Component Value Date/Time    WBC 10.5 2017 05:02 AM    WBC 7.1 2017 08:54 AM    HGB 12.0 2017 05:02 AM    HGB 12.5 2017 08:54 AM    HCT 35.1 2017 05:02 AM    HCT 35.2 2017 08:54 AM    PLATELET 619  05:02 AM    PLATELET 722  08:54 AM       No results found for this or any previous visit (from the past 24 hour(s)).

## 2017-12-16 VITALS
HEART RATE: 65 BPM | WEIGHT: 217 LBS | DIASTOLIC BLOOD PRESSURE: 98 MMHG | SYSTOLIC BLOOD PRESSURE: 139 MMHG | RESPIRATION RATE: 18 BRPM | BODY MASS INDEX: 38.45 KG/M2 | HEIGHT: 63 IN | OXYGEN SATURATION: 97 % | TEMPERATURE: 98.2 F

## 2017-12-16 PROCEDURE — 74011250637 HC RX REV CODE- 250/637: Performed by: OBSTETRICS & GYNECOLOGY

## 2017-12-16 RX ORDER — NIFEDIPINE 30 MG/1
60 TABLET, EXTENDED RELEASE ORAL DAILY
Status: DISCONTINUED | OUTPATIENT
Start: 2017-12-17 | End: 2017-12-16

## 2017-12-16 RX ORDER — OXYCODONE AND ACETAMINOPHEN 5; 325 MG/1; MG/1
2 TABLET ORAL
Qty: 50 TAB | Refills: 0 | Status: SHIPPED | OUTPATIENT
Start: 2017-12-16

## 2017-12-16 RX ORDER — NIFEDIPINE 30 MG/1
30 TABLET, EXTENDED RELEASE ORAL DAILY
Qty: 30 TAB | Refills: 0 | Status: SHIPPED | OUTPATIENT
Start: 2017-12-16

## 2017-12-16 RX ORDER — NIFEDIPINE 30 MG/1
30 TABLET, EXTENDED RELEASE ORAL DAILY
Status: DISCONTINUED | OUTPATIENT
Start: 2017-12-17 | End: 2017-12-16

## 2017-12-16 RX ORDER — IBUPROFEN 800 MG/1
800 TABLET ORAL
Qty: 80 TAB | Refills: 2 | Status: SHIPPED | OUTPATIENT
Start: 2017-12-16

## 2017-12-16 RX ORDER — NIFEDIPINE 30 MG/1
30 TABLET, EXTENDED RELEASE ORAL DAILY
Status: DISCONTINUED | OUTPATIENT
Start: 2017-12-16 | End: 2017-12-16 | Stop reason: HOSPADM

## 2017-12-16 RX ADMIN — IBUPROFEN 800 MG: 400 TABLET ORAL at 01:48

## 2017-12-16 RX ADMIN — IBUPROFEN 800 MG: 400 TABLET ORAL at 09:43

## 2017-12-16 RX ADMIN — NIFEDIPINE 30 MG: 30 TABLET, FILM COATED, EXTENDED RELEASE ORAL at 08:26

## 2017-12-16 NOTE — PROGRESS NOTES
Infant discharged home with mom. Instructions given to mom. All questions answered. Verbalized understanding. No distress noted. Signed copy of discharge instructions on paper chart. Discharge summary faxed to [facility].

## 2017-12-16 NOTE — DISCHARGE SUMMARY
Obstetrical Discharge Summary     Name: Quinton Story MRN: [id number]  SSN: [id number]    YOB: 1980  Age: 30 y.o. Sex: female      Admit Date: 2017    Discharge Date: 2017     Admitting Physician: Olayinka Sal MD     Attending Physician:  Olayinka Sal MD     Admission Diagnoses: Katie  Pregnant  IUP 40 5/7 PREVIOUS C/SECTION IN LABOR  Pregnant    Discharge Diagnoses:   Information for the patient's :  Johanny Pro [[id number]]   Delivery of a 3.195 kg female infant via , Low Transverse on 2017 at 12:47 PM  by . Apgars were 9 and 9. Additional Diagnoses:   Hospital Problems  Date Reviewed: 2017          Codes Class Noted POA    Pregnant ICD-10-CM: Z34.90  ICD-9-CM: V22.2  2017 Yes        Oligohydramnios ICD-10-CM: O41.00X0  ICD-9-CM: 658.00  2017 Yes        Thrombocytopenia affecting pregnancy ([facility] [address] ICD-10-CM: O99.119, D69.6  ICD-9-CM: 649.30, 287.5  2017 Yes        Gestational hypertension ICD-10-CM: O13.9  ICD-9-CM: 642.30  2017 Unknown             Lab Results   Component Value Date/Time    Rubella, External IMMUNE 2017    GrBStrep, External POSITIVE 2017       Hospital Course: Postpartum course was complicated by hypertension, which added 1 days to the patient's length of stay. Patient now controlled with Procardia XL 30mg. Disposition at Discharge: Home or self care    Discharged Condition: Stable    Patient Instructions:   Current Discharge Medication List      START taking these medications    Details   ibuprofen (MOTRIN) 800 mg tablet Take 1 Tab by mouth every eight (8) hours as needed for Pain. Qty: 80 Tab, Refills: 2      NIFEdipine ER (PROCARDIA XL) 30 mg ER tablet Take 1 Tab by mouth daily. Qty: 30 Tab, Refills: 0      oxyCODONE-acetaminophen (PERCOCET) 5-325 mg per tablet Take 2 Tabs by mouth every six (6) hours as needed. Max Daily Amount: 8 Tabs.   Qty: 50 Tab, Refills: 0 CONTINUE these medications which have NOT CHANGED    Details   PNV NO.118/IRON FUMARATE/FA (PRENATAL #118-IRON-FOLIC ACID PO) Take 1 Tab by mouth daily. STOP taking these medications       acetaminophen (TYLENOL EXTRA STRENGTH) 500 mg tablet Comments:   Reason for Stopping:         progesterone (PROMETRIUM) 200 mg capsule Comments:   Reason for Stopping:               Reference my discharge instructions. Follow-up Appointments   Procedures    FOLLOW UP VISIT Appointment in: One Week You will be contacted for a blood pressure check in 1 week     You will be contacted for a blood pressure check in 1 week     Standing Status:   Standing     Number of Occurrences:   1     Order Specific Question:   Appointment in     Answer:    One Week        Signed By:  Jordon Hussein MD     December 16, 2017

## 2017-12-16 NOTE — DISCHARGE INSTRUCTIONS
Section: What to Expect at [address] [place] [address]    A  section, or , is surgery to deliver your baby through a cut, called an incision, that the doctor makes in your lower belly and uterus. You may have some pain in your lower belly and need pain medicine for 1 to 2 weeks. You can expect some vaginal bleeding for several weeks. You will probably need about 6 weeks to fully recover. It is important to take it easy while the incision is healing. Avoid heavy lifting, strenuous activities, or exercises that strain the belly muscles while you are recovering. Ask a family member or friend for help with housework, cooking, and shopping. This care sheet gives you a general idea about how long it will take for you to recover. But each person recovers at a different pace. Follow the steps below to get better as quickly as possible. How can you care for yourself at home? Activity  ? · Rest when you feel tired. Getting enough sleep will help you recover. ? · Try to walk each day. Start by walking a little more than you did the day before. Bit by bit, increase the amount you walk. Walking boosts blood flow and helps prevent pneumonia, constipation, and blood clots. ? · Avoid strenuous activities, such as bicycle riding, jogging, weightlifting, and aerobic exercise, for 6 weeks or until your doctor says it is okay. ? · Until your doctor says it is okay, do not lift anything heavier than your baby. ? · Do not do sit-ups or other exercises that strain the belly muscles for 6 weeks or until your doctor says it is okay. ? · Hold a pillow over your incision when you cough or take deep breaths. This will support your belly and decrease your pain. ? · You may shower as usual. Pat the incision dry when you are done. ? · You will have some vaginal bleeding. Wear sanitary pads. Do not douche or use tampons until your doctor says it is okay. ? · Ask your doctor when you can drive again. ? · You will probably need to take at least 6 weeks off work. It depends on the type of work you do and how you feel. ? · Ask your doctor when it is okay for you to have sex. Diet  ? · You can eat your normal diet. If your stomach is upset, try bland, low-fat foods like plain rice, broiled chicken, toast, and yogurt. ? · Drink plenty of fluids (unless your doctor tells you not to). ? · You may notice that your bowel movements are not regular right after your surgery. This is common. Try to avoid constipation and straining with bowel movements. You may want to take a fiber supplement every day. If you have not had a bowel movement after a couple of days, ask your doctor about taking a mild laxative. ? · If you are breastfeeding, do not drink any alcohol. Medicines  ? · Your doctor will tell you if and when you can restart your medicines. He or she will also give you instructions about taking any new medicines. ? · If you take blood thinners, such as warfarin (Coumadin), clopidogrel (Plavix), or aspirin, be sure to talk to your doctor. He or she will tell you if and when to start taking those medicines again. Make sure that you understand exactly what your doctor wants you to do. ? · Take pain medicines exactly as directed. ¨ If the doctor gave you a prescription medicine for pain, take it as prescribed. ¨ If you are not taking a prescription pain medicine, ask your doctor if you can take an over-the-counter medicine. ? · If you think your pain medicine is making you sick to your stomach:  ¨ Take your medicine after meals (unless your doctor has told you not to). ¨ Ask your doctor for a different pain medicine. ? · If your doctor prescribed antibiotics, take them as directed. Do not stop taking them just because you feel better. You need to take the full course of antibiotics. Incision care  ?  · If you have strips of tape on the incision, leave the tape on for a week or until it falls off.   ? · Wash the area daily with warm, soapy water, and pat it dry. Don't use hydrogen peroxide or alcohol, which can slow healing. You may cover the area with a gauze bandage if it weeps or rubs against clothing. Change the bandage every day. ? · Keep the area clean and dry. Other instructions  ? · If you breastfeed your baby, you may be more comfortable while you are healing if you place the baby so that he or she is not resting on your belly. Try tucking your baby under your arm, with his or her body along the side you will be feeding on. Support your baby's upper body with your arm. With that hand you can control your baby's head to bring his or her mouth to your breast. This is sometimes called the football hold. Follow-up care is a key part of your treatment and safety. Be sure to make and go to all appointments, and call your doctor if you are having problems. It's also a good idea to know your test results and keep a list of the medicines you take. When should you call for help? Call 911 anytime you think you may need emergency care. For example, call if:  ? · You passed out (lost consciousness). ? · You have chest pain, are short of breath, or cough up blood. ?Call your doctor now or seek immediate medical care if:  ? · You have pain that does not get better after you take pain medicine. ? · You have severe vaginal bleeding. ? · You are dizzy or lightheaded, or you feel like you may faint. ? · You have new or worse pain in your belly or pelvis. ? · You have loose stitches, or your incision comes open. ? · You have symptoms of infection, such as:  ¨ Increased pain, swelling, warmth, or redness. ¨ Red streaks leading from the incision. ¨ Pus draining from the incision. ¨ A fever. ? · You have symptoms of a blood clot in your leg (called a deep vein thrombosis), such as:  ¨ Pain in your calf, back of the knee, thigh, or groin. ¨ Redness and swelling in your leg or groin. ? Watch closely for changes in your health, and be sure to contact your doctor if:  ? · You do not get better as expected. Where can you learn more? Go to http://klaudia-grzegorz.info/. Enter M806 in the search box to learn more about \" Section: What to Expect at Home. \"  Current as of: 2017  Content Version: 11.4  © 6723-3930 [employer]. Care instructions adapted under license by [employer] (which disclaims liability or warranty for this information). If you have questions about a medical condition or this instruction, always ask your healthcare professional. [address] any warranty or liability for your use of this information. App[employer] Activation    Thank you for requesting access to [employer]. Please follow the instructions below to securely access and download your online medical record. [employer] allows you to send messages to your doctor, view your test results, renew your prescriptions, schedule appointments, and more. How Do I Sign Up? 1. In your internet browser, go to [email or web address]. 2. Click on the First Time User? Click Here link in the Sign In box. You will see the New Member Sign Up page. 3. Enter your [employer] Access Code exactly as it appears below. You will not need to use this code after youve completed the sign-up process. If you do not sign up before the expiration date, you must request a new code. App[employer] Access Code: [email or web address]  Expires: 3/16/2018 10:37 AM (This is the date your [employer] access code will )    4. Enter the last four digits of your Social Security Number (xxxx) and Date of Birth (mm/dd/yyyy) as indicated and click Submit. You will be taken to the next sign-up page. 5. Create a Apptimate ID. This will be your Apptimate login ID and cannot be changed, so think of one that is secure and easy to remember. 6. Create a App[employer] password.  You can change your password at any time. 7. Enter your Password Reset Question and Answer. This can be used at a later time if you forget your password. 8. Enter your e-mail address. You will receive e-mail notification when new information is available in [address] 9. Click Sign Up. You can now view and download portions of your medical record. 10. Click the Download Summary menu link to download a portable copy of your medical information. Additional Information    If you have questions, please visit the Frequently Asked Questions section of the [employer] website at [email or web address] Remember, [employer] is NOT to be used for urgent needs. For medical emergencies, dial 911.

## 2017-12-16 NOTE — PROGRESS NOTES
Post-Operative  Day 4    Yolis Hart       Assessment: Post-Op day 4, doing well  Gestational HTN- BP overall improved with procardia XL now with mild range BP       Plan:   1. Discharge home today  2. Follow up in office in 1 week for blood pressure check and  6 weeks with Lela Farfan MD  3. Post partum activity/wound care advised, diet as tolerated  4. Discharge Medications: ibuprofen, percocet and medications prior to admission      Information for the patient's :  Rolan Bouquet [[id number]]   , Low Transverse   Patient doing well without significant complaint. Tolerating diet, passing flatus, voiding and ambulating without difficulty. Denies headache, visual changes, RUQ pain. Vitals:  Visit Vitals    BP (!) 139/98    Pulse 65    Temp 98.2 °F (36.8 °C)    Resp 18    Ht 5' 3\" (1.6 m)    Wt 98.4 kg (217 lb)    LMP 2017    SpO2 97%    Breastfeeding Unknown    BMI 38.44 kg/m2     Temp (24hrs), Av.1 °F (36.7 °C), Min:97.9 °F (36.6 °C), Max:98.2 °F (36.8 °C)        Exam:        Patient without distress. Abdomen, bowel sounds present, soft, expected tenderness, fundus firm                Wound incision clean, dry and intact               Lower extremities are negative for swelling, cords or tenderness. Labs:   Lab Results   Component Value Date/Time    WBC 10.5 2017 05:02 AM    WBC 7.1 2017 08:54 AM    HGB 12.0 2017 05:02 AM    HGB 12.5 2017 08:54 AM    HCT 35.1 2017 05:02 AM    HCT 35.2 2017 08:54 AM    PLATELET 836  05:02 AM    PLATELET 558  08:54 AM       No results found for this or any previous visit (from the past 24 hour(s)).

## 2017-12-16 NOTE — PROGRESS NOTES
Bedside shift change report given to DARIUS Alas RN (oncoming nurse) by DARIUS Sharma RN (offgoing nurse). Report included the following information SBAR, Procedure Summary, Intake/Output, MAR and Recent Results.

## 2022-03-19 PROBLEM — D69.6 THROMBOCYTOPENIA AFFECTING PREGNANCY: Status: ACTIVE | Noted: 2017-12-12

## 2022-03-19 PROBLEM — O41.00X0 OLIGOHYDRAMNIOS: Status: ACTIVE | Noted: 2017-12-12

## 2022-03-19 PROBLEM — Z34.90 PREGNANT: Status: ACTIVE | Noted: 2017-12-12

## 2022-03-19 PROBLEM — O13.9 GESTATIONAL HYPERTENSION: Status: ACTIVE | Noted: 2017-12-12

## 2022-03-19 PROBLEM — O99.119 THROMBOCYTOPENIA AFFECTING PREGNANCY: Status: ACTIVE | Noted: 2017-12-12

## 2025-02-14 SDOH — HEALTH STABILITY: PHYSICAL HEALTH: ON AVERAGE, HOW MANY MINUTES DO YOU ENGAGE IN EXERCISE AT THIS LEVEL?: 0 MIN

## 2025-02-14 SDOH — HEALTH STABILITY: PHYSICAL HEALTH: ON AVERAGE, HOW MANY DAYS PER WEEK DO YOU ENGAGE IN MODERATE TO STRENUOUS EXERCISE (LIKE A BRISK WALK)?: 0 DAYS

## 2025-02-17 ENCOUNTER — OFFICE VISIT (OUTPATIENT)
Facility: CLINIC | Age: 45
End: 2025-02-17
Payer: COMMERCIAL

## 2025-02-17 VITALS
SYSTOLIC BLOOD PRESSURE: 148 MMHG | WEIGHT: 223 LBS | BODY MASS INDEX: 39.51 KG/M2 | RESPIRATION RATE: 16 BRPM | HEIGHT: 63 IN | OXYGEN SATURATION: 97 % | DIASTOLIC BLOOD PRESSURE: 96 MMHG | HEART RATE: 68 BPM | TEMPERATURE: 97.2 F

## 2025-02-17 DIAGNOSIS — E66.9 OBESITY (BMI 30-39.9): ICD-10-CM

## 2025-02-17 DIAGNOSIS — F41.9 ANXIETY: ICD-10-CM

## 2025-02-17 DIAGNOSIS — Z76.89 ENCOUNTER TO ESTABLISH CARE: Primary | ICD-10-CM

## 2025-02-17 DIAGNOSIS — I10 PRIMARY HYPERTENSION: ICD-10-CM

## 2025-02-17 PROBLEM — D69.6 THROMBOCYTOPENIA AFFECTING PREGNANCY: Status: RESOLVED | Noted: 2017-12-12 | Resolved: 2025-02-17

## 2025-02-17 PROBLEM — Z34.90 PREGNANT: Status: RESOLVED | Noted: 2017-12-12 | Resolved: 2025-02-17

## 2025-02-17 PROBLEM — O41.00X0 OLIGOHYDRAMNIOS: Status: RESOLVED | Noted: 2017-12-12 | Resolved: 2025-02-17

## 2025-02-17 PROBLEM — O99.119 THROMBOCYTOPENIA AFFECTING PREGNANCY: Status: RESOLVED | Noted: 2017-12-12 | Resolved: 2025-02-17

## 2025-02-17 PROBLEM — O13.9 GESTATIONAL HYPERTENSION: Status: RESOLVED | Noted: 2017-12-12 | Resolved: 2025-02-17

## 2025-02-17 PROCEDURE — 3077F SYST BP >= 140 MM HG: CPT

## 2025-02-17 PROCEDURE — 3080F DIAST BP >= 90 MM HG: CPT

## 2025-02-17 PROCEDURE — 99204 OFFICE O/P NEW MOD 45 MIN: CPT

## 2025-02-17 RX ORDER — AMLODIPINE BESYLATE 5 MG/1
5 TABLET ORAL DAILY
COMMUNITY
Start: 2025-01-11 | End: 2025-02-17 | Stop reason: SDUPTHER

## 2025-02-17 RX ORDER — AMLODIPINE BESYLATE 5 MG/1
5 TABLET ORAL DAILY
Qty: 90 TABLET | Refills: 2 | Status: SHIPPED | OUTPATIENT
Start: 2025-02-17

## 2025-02-17 SDOH — ECONOMIC STABILITY: FOOD INSECURITY: WITHIN THE PAST 12 MONTHS, YOU WORRIED THAT YOUR FOOD WOULD RUN OUT BEFORE YOU GOT MONEY TO BUY MORE.: NEVER TRUE

## 2025-02-17 SDOH — ECONOMIC STABILITY: FOOD INSECURITY: WITHIN THE PAST 12 MONTHS, THE FOOD YOU BOUGHT JUST DIDN'T LAST AND YOU DIDN'T HAVE MONEY TO GET MORE.: NEVER TRUE

## 2025-02-17 ASSESSMENT — ANXIETY QUESTIONNAIRES
4. TROUBLE RELAXING: SEVERAL DAYS
6. BECOMING EASILY ANNOYED OR IRRITABLE: NEARLY EVERY DAY
7. FEELING AFRAID AS IF SOMETHING AWFUL MIGHT HAPPEN: NOT AT ALL
3. WORRYING TOO MUCH ABOUT DIFFERENT THINGS: NOT AT ALL
IF YOU CHECKED OFF ANY PROBLEMS ON THIS QUESTIONNAIRE, HOW DIFFICULT HAVE THESE PROBLEMS MADE IT FOR YOU TO DO YOUR WORK, TAKE CARE OF THINGS AT HOME, OR GET ALONG WITH OTHER PEOPLE: NOT DIFFICULT AT ALL
GAD7 TOTAL SCORE: 6
2. NOT BEING ABLE TO STOP OR CONTROL WORRYING: SEVERAL DAYS
1. FEELING NERVOUS, ANXIOUS, OR ON EDGE: SEVERAL DAYS
5. BEING SO RESTLESS THAT IT IS HARD TO SIT STILL: NOT AT ALL

## 2025-02-17 ASSESSMENT — PATIENT HEALTH QUESTIONNAIRE - PHQ9
2. FEELING DOWN, DEPRESSED OR HOPELESS: NOT AT ALL
1. LITTLE INTEREST OR PLEASURE IN DOING THINGS: NOT AT ALL
SUM OF ALL RESPONSES TO PHQ QUESTIONS 1-9: 0
SUM OF ALL RESPONSES TO PHQ9 QUESTIONS 1 & 2: 0

## 2025-02-17 NOTE — PROGRESS NOTES
Chief Complaint   Patient presents with    Hypertension     [facility]p [facility] in [facility] and it was high-- and running high at home needs bp meds     No data recorded  \"Have you been to the ER, urgent care clinic since your last visit?  Hospitalized since your last visit?\"    NO    “Have you seen or consulted any other health care providers outside our system since your last visit?”    NO    Have you had a mammogram?”   YES - Where: [facility] Nurse/CMA to request most recent records if not in the chart    No breast cancer screening on file      “Have you had a pap smear?”    YES - Where: [facility] Nurse/CMA to request most recent records if not in the chart    No cervical cancer screening on file         Click Here for Release of Records Request   BP (!) 148/96 (Site: Left Upper Arm, Position: Sitting, Cuff Size: Large Adult)   Pulse 68   Temp 97.2 °F (36.2 °C) (Temporal)   Resp 16   Ht 1.6 m (5' 3\")   Wt 101.2 kg (223 lb)   LMP 01/25/2025   SpO2 97%   BMI 39.50 kg/m²

## 2025-02-17 NOTE — PROGRESS NOTES
Establishing Care    Subjective  Chief Complaint   Patient presents with    Hypertension     [facility]p [facility] and it was high-- and running high at home needs bp meds     1. Encounter to establish care  HPI:  Natalia Aponte is a 44 y.o. female. Patient presents today to establish care. Reviewed patient's medical, surgical, and family history. Medications and allergies updated and accurate.     2. Primary hypertension  Patient takes 5mg amlodipine, she was diagnosed in November with HTN at [facility]. She took her last pill yesterday. Patient does have a blood pressure cuff at home, she does not often check it.     3. Anxiety  Patient has a history of anxiety. GAD7: 6, PHQ9: 0. She does not take medication for this.        Specialists  OB/Gyn- [facility]    Past Medical History:   Diagnosis Date    Anxiety     Hypertension      Past Surgical History:   Procedure Laterality Date    CERVICAL CERCLAGE       SECTION      ,       Family History   Problem Relation Age of Onset    Arthritis Mother     High Blood Pressure Mother      Social History     Socioeconomic History    Marital status: Legally      Spouse name: Not on file    Number of children: Not on file    Years of education: Not on file    Highest education level: Not on file   Occupational History    Not on file   Tobacco Use    Smoking status: Never    Smokeless tobacco: Never   Vaping Use    Vaping status: Never Used   Substance and Sexual Activity    Alcohol use: Not Currently    Drug use: Never    Sexual activity: Yes     Partners: Male   Other Topics Concern    Not on file   Social History Narrative    Not on file     Social Determinants of Health     Financial Resource Strain: Not on file   Food Insecurity: No Food Insecurity (2025)    Hunger Vital Sign     Worried About Running Out of Food in the Last Year: Never true     Ran Out of Food in the Last Year: Never true   Transportation Needs: No

## 2025-02-20 ASSESSMENT — ENCOUNTER SYMPTOMS
SHORTNESS OF BREATH: 0
WHEEZING: 0
ABDOMINAL PAIN: 0
CHEST TIGHTNESS: 0

## 2025-02-20 NOTE — ASSESSMENT & PLAN NOTE
-Advised patient to check her blood pressures 3-4 times weekly and contact provider in two weeks to share readings.  -Reviewed lifestyle modifications for hypertension.

## 2025-02-20 NOTE — ASSESSMENT & PLAN NOTE
-Discussed daily water intake of at least eight 8 ounce cups daily  -Discussed exercising at least thirty minutes daily, even if broken up into five minute increments in the day  -Discussed stress reduction and getting adequate sleep at night  -Discussed diet high in lean proteins and vegetables  -Discussed benefit of tracking calories and maintaining a calorie deficit of 1200 calories daily  -Discussed limiting sugary processed foods and take out

## 2025-02-21 ENCOUNTER — LAB (OUTPATIENT)
Facility: CLINIC | Age: 45
End: 2025-02-21

## 2025-02-22 LAB
25(OH)D3+25(OH)D2 SERPL-MCNC: 9 NG/ML (ref 30–100)
ALBUMIN SERPL-MCNC: 4.3 G/DL (ref 3.9–4.9)
ALP SERPL-CCNC: 72 IU/L (ref 44–121)
ALT SERPL-CCNC: 10 IU/L (ref 0–32)
AST SERPL-CCNC: 15 IU/L (ref 0–40)
BASOPHILS # BLD AUTO: 0 X10E3/UL (ref 0–0.2)
BASOPHILS NFR BLD AUTO: 1 %
BILIRUB SERPL-MCNC: 0.9 MG/DL (ref 0–1.2)
BUN SERPL-MCNC: 12 MG/DL (ref 6–24)
BUN/CREAT SERPL: 15 (ref 9–23)
CALCIUM SERPL-MCNC: 9.5 MG/DL (ref 8.7–10.2)
CHLORIDE SERPL-SCNC: 102 MMOL/L (ref 96–106)
CHOLEST SERPL-MCNC: 190 MG/DL (ref 100–199)
CO2 SERPL-SCNC: 25 MMOL/L (ref 20–29)
CREAT SERPL-MCNC: 0.82 MG/DL (ref 0.57–1)
EGFRCR SERPLBLD CKD-EPI 2021: 92 ML/MIN/1.73
EOSINOPHIL # BLD AUTO: 0.1 X10E3/UL (ref 0–0.4)
EOSINOPHIL NFR BLD AUTO: 3 %
ERYTHROCYTE [DISTWIDTH] IN BLOOD BY AUTOMATED COUNT: 13.1 % (ref 11.7–15.4)
GLOBULIN SER CALC-MCNC: 2.6 G/DL (ref 1.5–4.5)
GLUCOSE SERPL-MCNC: 94 MG/DL (ref 70–99)
HBA1C MFR BLD: 5.5 % (ref 4.8–5.6)
HCT VFR BLD AUTO: 42.8 % (ref 34–46.6)
HDLC SERPL-MCNC: 49 MG/DL
HGB BLD-MCNC: 14.4 G/DL (ref 11.1–15.9)
IMM GRANULOCYTES # BLD AUTO: 0 X10E3/UL (ref 0–0.1)
IMM GRANULOCYTES NFR BLD AUTO: 0 %
LDLC SERPL CALC-MCNC: 125 MG/DL (ref 0–99)
LYMPHOCYTES # BLD AUTO: 1.5 X10E3/UL (ref 0.7–3.1)
LYMPHOCYTES NFR BLD AUTO: 34 %
MCH RBC QN AUTO: 30.7 PG (ref 26.6–33)
MCHC RBC AUTO-ENTMCNC: 33.6 G/DL (ref 31.5–35.7)
MCV RBC AUTO: 91 FL (ref 79–97)
MONOCYTES # BLD AUTO: 0.3 X10E3/UL (ref 0.1–0.9)
MONOCYTES NFR BLD AUTO: 7 %
NEUTROPHILS # BLD AUTO: 2.4 X10E3/UL (ref 1.4–7)
NEUTROPHILS NFR BLD AUTO: 55 %
PLATELET # BLD AUTO: 231 X10E3/UL (ref 150–450)
POTASSIUM SERPL-SCNC: 4.1 MMOL/L (ref 3.5–5.2)
PROT SERPL-MCNC: 6.9 G/DL (ref 6–8.5)
RBC # BLD AUTO: 4.69 X10E6/UL (ref 3.77–5.28)
SODIUM SERPL-SCNC: 139 MMOL/L (ref 134–144)
TRIGL SERPL-MCNC: 88 MG/DL (ref 0–149)
TSH SERPL DL<=0.005 MIU/L-ACNC: 2.04 UIU/ML (ref 0.45–4.5)
VLDLC SERPL CALC-MCNC: 16 MG/DL (ref 5–40)
WBC # BLD AUTO: 4.5 X10E3/UL (ref 3.4–10.8)

## 2025-02-23 DIAGNOSIS — E55.9 VITAMIN D DEFICIENCY: Primary | ICD-10-CM

## 2025-02-23 RX ORDER — ERGOCALCIFEROL 1.25 MG/1
50000 CAPSULE, LIQUID FILLED ORAL WEEKLY
Qty: 8 CAPSULE | Refills: 0 | Status: SHIPPED | OUTPATIENT
Start: 2025-02-23

## (undated) DEVICE — SUTURE STRATAFIX SYMMETRIC SZ 1 L18IN ABSRB VLT CT1 L36CM SXPP1A404

## (undated) DEVICE — STERILE POLYISOPRENE POWDER-FREE SURGICAL GLOVES: Brand: PROTEXIS

## (undated) DEVICE — SOLUTION IRRIG 1000ML H2O STRL BLT

## (undated) DEVICE — SUTURE PROL SZ 1 L30IN NONABSORBABLE BLU L40MM CT 1/2 CIR 8435H

## (undated) DEVICE — LARGE, DISPOSABLE ALEXIS O C-SECTION PROTECTOR - RETRACTOR: Brand: ALEXIS ® O C-SECTION PROTECTOR - RETRACTOR

## (undated) DEVICE — PAD SANIT NPKN 4IN GRD

## (undated) DEVICE — SPONGE COUNTING BAG: Brand: DEVON

## (undated) DEVICE — TIP CLEANER: Brand: VALLEYLAB

## (undated) DEVICE — SOLIDIFIER FLUID 3000 CC ABSORB

## (undated) DEVICE — ABDOMINAL PAD: Brand: DERMACEA

## (undated) DEVICE — (D)PREP SKN CHLRAPRP APPL 26ML -- CONVERT TO ITEM 371833

## (undated) DEVICE — POOLE SUCTION INSTRUMENT WITH REMOVABLE SHEATH: Brand: POOLE

## (undated) DEVICE — GOWN,SIRUS,NONRNF,SETINSLV,2XL,18/CS: Brand: MEDLINE

## (undated) DEVICE — CONTINU-FLO SOLUTION SET, 2 INJECTION SITES, MALE LUER LOCK ADAPTER WITH RETRACTABLE COLLAR, LARGE BORE STOPCOCK WITH ROTATING MALE LUER LOCK EXTENSION SET, 2 INJECTION SITES, MALE LUER LOCK ADAPTER WITH RETRACTABLE COLLAR: Brand: INTERLINK/CONTINU-FLO

## (undated) DEVICE — 3M™ TEGADERM™ TRANSPARENT FILM DRESSING FRAME STYLE, 1624W, 2-3/8 IN X 2-3/4 IN (6 CM X 7 CM), 100/CT 4CT/CASE: Brand: 3M™ TEGADERM™

## (undated) DEVICE — KENDALL DL ECG CABLE AND LEAD WIRE SYSTEM, 3-LEAD, SINGLE PATIENT USE: Brand: KENDALL

## (undated) DEVICE — CATH URETH INTMIT ROB 14FR FUN -- USE ITEM 179521

## (undated) DEVICE — REM POLYHESIVE ADULT PATIENT RETURN ELECTRODE: Brand: VALLEYLAB

## (undated) DEVICE — AGENT HEMSTAT 5GM ARISTA AH

## (undated) DEVICE — GOWN,AURORA,FABRIC-REINFORCED,X-LARGE: Brand: MEDLINE

## (undated) DEVICE — 3000CC GUARDIAN II: Brand: GUARDIAN

## (undated) DEVICE — PREP PAD BNS: Brand: CONVERTORS

## (undated) DEVICE — PENCIL ES L3M BTTN SWCH S STL HEX LOK BLDE ELECTRD HOLSTER

## (undated) DEVICE — TRAY PREP DRY W/ PREM GLV 2 APPL 6 SPNG 2 UNDPD 1 OVERWRAP

## (undated) DEVICE — SOLUTION LACTATED RINGERS INJECTION USP

## (undated) DEVICE — SUTURE VCRL SZ 0 L36IN ABSRB VLT L40MM CT 1/2 CIR J358H

## (undated) DEVICE — STAPLER SKIN SQ 30 ABSRB STPL DISP INSORB

## (undated) DEVICE — DEVON™ KNEE AND BODY STRAP 60" X 3" (1.5 M X 7.6 CM): Brand: DEVON

## (undated) DEVICE — MEDI-VAC NON-CONDUCTIVE SUCTION TUBING: Brand: CARDINAL HEALTH

## (undated) DEVICE — Device

## (undated) DEVICE — SUTURE VCRL SZ 2-0 L36IN ABSRB VLT L36MM CT-1 1/2 CIR J345H

## (undated) DEVICE — PACK PROCEDURE SURG C SECT KT SMH

## (undated) DEVICE — INFECTION CONTROL KIT SYS

## (undated) DEVICE — LIGHT HANDLE: Brand: DEVON

## (undated) DEVICE — SOLUTION IV 1000ML 0.9% SOD CHL